# Patient Record
Sex: MALE | Race: WHITE | Employment: UNEMPLOYED | URBAN - METROPOLITAN AREA
[De-identification: names, ages, dates, MRNs, and addresses within clinical notes are randomized per-mention and may not be internally consistent; named-entity substitution may affect disease eponyms.]

---

## 2021-12-14 ENCOUNTER — APPOINTMENT (OUTPATIENT)
Dept: GENERAL RADIOLOGY | Age: 31
DRG: 316 | End: 2021-12-14
Attending: EMERGENCY MEDICINE
Payer: MEDICAID

## 2021-12-14 ENCOUNTER — APPOINTMENT (OUTPATIENT)
Dept: CT IMAGING | Age: 31
DRG: 316 | End: 2021-12-14
Attending: EMERGENCY MEDICINE
Payer: MEDICAID

## 2021-12-14 ENCOUNTER — HOSPITAL ENCOUNTER (INPATIENT)
Age: 31
LOS: 8 days | Discharge: HOME | DRG: 316 | End: 2021-12-22
Attending: EMERGENCY MEDICINE | Admitting: INTERNAL MEDICINE
Payer: MEDICAID

## 2021-12-14 DIAGNOSIS — R10.9 ABDOMINAL PAIN, UNSPECIFIED ABDOMINAL LOCATION: ICD-10-CM

## 2021-12-14 DIAGNOSIS — B18.2 CHRONIC HEPATITIS C WITHOUT HEPATIC COMA (HCC): ICD-10-CM

## 2021-12-14 DIAGNOSIS — M86.9 OSTEOMYELITIS OF FINGER OF RIGHT HAND (HCC): Primary | ICD-10-CM

## 2021-12-14 DIAGNOSIS — F19.10 POLYSUBSTANCE ABUSE (HCC): ICD-10-CM

## 2021-12-14 DIAGNOSIS — E43 SEVERE PROTEIN-CALORIE MALNUTRITION (HCC): ICD-10-CM

## 2021-12-14 DIAGNOSIS — F11.93 OPIATE WITHDRAWAL (HCC): ICD-10-CM

## 2021-12-14 LAB
ALBUMIN SERPL-MCNC: 3.4 G/DL (ref 3.5–5)
ALBUMIN/GLOB SERPL: 0.8 {RATIO} (ref 1.1–2.2)
ALP SERPL-CCNC: 70 U/L (ref 45–117)
ALT SERPL-CCNC: 21 U/L (ref 12–78)
ANION GAP SERPL CALC-SCNC: 6 MMOL/L (ref 5–15)
AST SERPL-CCNC: 29 U/L (ref 15–37)
BASOPHILS # BLD: 0 K/UL (ref 0–0.1)
BASOPHILS NFR BLD: 0 % (ref 0–1)
BILIRUB SERPL-MCNC: 0.4 MG/DL (ref 0.2–1)
BUN SERPL-MCNC: 13 MG/DL (ref 6–20)
BUN/CREAT SERPL: 15 (ref 12–20)
CALCIUM SERPL-MCNC: 10 MG/DL (ref 8.5–10.1)
CHLORIDE SERPL-SCNC: 105 MMOL/L (ref 97–108)
CO2 SERPL-SCNC: 27 MMOL/L (ref 21–32)
CREAT SERPL-MCNC: 0.86 MG/DL (ref 0.7–1.3)
DIFFERENTIAL METHOD BLD: ABNORMAL
EOSINOPHIL # BLD: 0 K/UL (ref 0–0.4)
EOSINOPHIL NFR BLD: 0 % (ref 0–7)
ERYTHROCYTE [DISTWIDTH] IN BLOOD BY AUTOMATED COUNT: 14 % (ref 11.5–14.5)
GLOBULIN SER CALC-MCNC: 4.4 G/DL (ref 2–4)
GLUCOSE SERPL-MCNC: 118 MG/DL (ref 65–100)
HCT VFR BLD AUTO: 41.7 % (ref 36.6–50.3)
HGB BLD-MCNC: 13.9 G/DL (ref 12.1–17)
IMM GRANULOCYTES # BLD AUTO: 0.1 K/UL (ref 0–0.04)
IMM GRANULOCYTES NFR BLD AUTO: 1 % (ref 0–0.5)
LACTATE BLD-SCNC: 1.52 MMOL/L (ref 0.4–2)
LIPASE SERPL-CCNC: 103 U/L (ref 73–393)
LYMPHOCYTES # BLD: 2.8 K/UL (ref 0.8–3.5)
LYMPHOCYTES NFR BLD: 19 % (ref 12–49)
MAGNESIUM SERPL-MCNC: 2.2 MG/DL (ref 1.6–2.4)
MCH RBC QN AUTO: 28.1 PG (ref 26–34)
MCHC RBC AUTO-ENTMCNC: 33.3 G/DL (ref 30–36.5)
MCV RBC AUTO: 84.4 FL (ref 80–99)
MONOCYTES # BLD: 0.4 K/UL (ref 0–1)
MONOCYTES NFR BLD: 3 % (ref 5–13)
NEUTS SEG # BLD: 11.3 K/UL (ref 1.8–8)
NEUTS SEG NFR BLD: 77 % (ref 32–75)
NRBC # BLD: 0 K/UL (ref 0–0.01)
NRBC BLD-RTO: 0 PER 100 WBC
PLATELET # BLD AUTO: 634 K/UL (ref 150–400)
PMV BLD AUTO: 8.3 FL (ref 8.9–12.9)
POTASSIUM SERPL-SCNC: 3.6 MMOL/L (ref 3.5–5.1)
PROT SERPL-MCNC: 7.8 G/DL (ref 6.4–8.2)
RBC # BLD AUTO: 4.94 M/UL (ref 4.1–5.7)
RBC MORPH BLD: ABNORMAL
SODIUM SERPL-SCNC: 138 MMOL/L (ref 136–145)
TROPONIN-HIGH SENSITIVITY: 4 NG/L (ref 0–76)
WBC # BLD AUTO: 14.6 K/UL (ref 4.1–11.1)

## 2021-12-14 PROCEDURE — 96375 TX/PRO/DX INJ NEW DRUG ADDON: CPT

## 2021-12-14 PROCEDURE — 84484 ASSAY OF TROPONIN QUANT: CPT

## 2021-12-14 PROCEDURE — 74011000636 HC RX REV CODE- 636: Performed by: INTERNAL MEDICINE

## 2021-12-14 PROCEDURE — 74011250637 HC RX REV CODE- 250/637: Performed by: EMERGENCY MEDICINE

## 2021-12-14 PROCEDURE — 96374 THER/PROPH/DIAG INJ IV PUSH: CPT

## 2021-12-14 PROCEDURE — 85025 COMPLETE CBC W/AUTO DIFF WBC: CPT

## 2021-12-14 PROCEDURE — 87040 BLOOD CULTURE FOR BACTERIA: CPT

## 2021-12-14 PROCEDURE — 74011000258 HC RX REV CODE- 258: Performed by: EMERGENCY MEDICINE

## 2021-12-14 PROCEDURE — 99284 EMERGENCY DEPT VISIT MOD MDM: CPT

## 2021-12-14 PROCEDURE — 83690 ASSAY OF LIPASE: CPT

## 2021-12-14 PROCEDURE — 74011250636 HC RX REV CODE- 250/636: Performed by: INTERNAL MEDICINE

## 2021-12-14 PROCEDURE — 73140 X-RAY EXAM OF FINGER(S): CPT

## 2021-12-14 PROCEDURE — 65660000000 HC RM CCU STEPDOWN

## 2021-12-14 PROCEDURE — 83605 ASSAY OF LACTIC ACID: CPT

## 2021-12-14 PROCEDURE — 80053 COMPREHEN METABOLIC PANEL: CPT

## 2021-12-14 PROCEDURE — 74011250636 HC RX REV CODE- 250/636: Performed by: EMERGENCY MEDICINE

## 2021-12-14 PROCEDURE — 83735 ASSAY OF MAGNESIUM: CPT

## 2021-12-14 PROCEDURE — 36415 COLL VENOUS BLD VENIPUNCTURE: CPT

## 2021-12-14 PROCEDURE — 74177 CT ABD & PELVIS W/CONTRAST: CPT

## 2021-12-14 PROCEDURE — 71045 X-RAY EXAM CHEST 1 VIEW: CPT

## 2021-12-14 RX ORDER — ONDANSETRON 2 MG/ML
4 INJECTION INTRAMUSCULAR; INTRAVENOUS
Status: DISCONTINUED | OUTPATIENT
Start: 2021-12-14 | End: 2021-12-22 | Stop reason: HOSPADM

## 2021-12-14 RX ORDER — SODIUM CHLORIDE 9 MG/ML
75 INJECTION, SOLUTION INTRAVENOUS CONTINUOUS
Status: DISCONTINUED | OUTPATIENT
Start: 2021-12-14 | End: 2021-12-16

## 2021-12-14 RX ORDER — ONDANSETRON 2 MG/ML
4 INJECTION INTRAMUSCULAR; INTRAVENOUS
Status: COMPLETED | OUTPATIENT
Start: 2021-12-14 | End: 2021-12-14

## 2021-12-14 RX ORDER — ACETAMINOPHEN 325 MG/1
650 TABLET ORAL
Status: DISCONTINUED | OUTPATIENT
Start: 2021-12-14 | End: 2021-12-22 | Stop reason: HOSPADM

## 2021-12-14 RX ORDER — SODIUM CHLORIDE 0.9 % (FLUSH) 0.9 %
5-40 SYRINGE (ML) INJECTION EVERY 8 HOURS
Status: DISCONTINUED | OUTPATIENT
Start: 2021-12-14 | End: 2021-12-22 | Stop reason: HOSPADM

## 2021-12-14 RX ORDER — FOLIC ACID 1 MG/1
1 TABLET ORAL DAILY
Status: DISCONTINUED | OUTPATIENT
Start: 2021-12-15 | End: 2021-12-22 | Stop reason: HOSPADM

## 2021-12-14 RX ORDER — FAMOTIDINE 20 MG/1
20 TABLET, FILM COATED ORAL
Status: COMPLETED | OUTPATIENT
Start: 2021-12-14 | End: 2021-12-14

## 2021-12-14 RX ORDER — POLYETHYLENE GLYCOL 3350 17 G/17G
17 POWDER, FOR SOLUTION ORAL DAILY PRN
Status: DISCONTINUED | OUTPATIENT
Start: 2021-12-14 | End: 2021-12-22 | Stop reason: HOSPADM

## 2021-12-14 RX ORDER — VANCOMYCIN/0.9 % SOD CHLORIDE 1.5G/250ML
1500 PLASTIC BAG, INJECTION (ML) INTRAVENOUS
Status: COMPLETED | OUTPATIENT
Start: 2021-12-14 | End: 2021-12-14

## 2021-12-14 RX ORDER — SODIUM CHLORIDE 0.9 % (FLUSH) 0.9 %
5-40 SYRINGE (ML) INJECTION AS NEEDED
Status: DISCONTINUED | OUTPATIENT
Start: 2021-12-14 | End: 2021-12-22 | Stop reason: HOSPADM

## 2021-12-14 RX ORDER — HEPARIN SODIUM 5000 [USP'U]/ML
5000 INJECTION, SOLUTION INTRAVENOUS; SUBCUTANEOUS EVERY 12 HOURS
Status: DISCONTINUED | OUTPATIENT
Start: 2021-12-16 | End: 2021-12-20 | Stop reason: SDUPTHER

## 2021-12-14 RX ORDER — LORAZEPAM 2 MG/ML
1 INJECTION INTRAMUSCULAR
Status: COMPLETED | OUTPATIENT
Start: 2021-12-14 | End: 2021-12-14

## 2021-12-14 RX ORDER — ENOXAPARIN SODIUM 100 MG/ML
40 INJECTION SUBCUTANEOUS DAILY
Status: DISCONTINUED | OUTPATIENT
Start: 2021-12-16 | End: 2021-12-14

## 2021-12-14 RX ORDER — ACETAMINOPHEN 650 MG/1
650 SUPPOSITORY RECTAL
Status: DISCONTINUED | OUTPATIENT
Start: 2021-12-14 | End: 2021-12-22 | Stop reason: HOSPADM

## 2021-12-14 RX ORDER — ONDANSETRON 4 MG/1
4 TABLET, ORALLY DISINTEGRATING ORAL
Status: DISCONTINUED | OUTPATIENT
Start: 2021-12-14 | End: 2021-12-22 | Stop reason: HOSPADM

## 2021-12-14 RX ORDER — ASPIRIN 325 MG/1
100 TABLET, FILM COATED ORAL DAILY
Status: DISCONTINUED | OUTPATIENT
Start: 2021-12-15 | End: 2021-12-22 | Stop reason: HOSPADM

## 2021-12-14 RX ORDER — LORAZEPAM 2 MG/ML
2 INJECTION INTRAMUSCULAR
Status: DISCONTINUED | OUTPATIENT
Start: 2021-12-14 | End: 2021-12-18

## 2021-12-14 RX ORDER — LORAZEPAM 2 MG/ML
4 INJECTION INTRAMUSCULAR
Status: DISCONTINUED | OUTPATIENT
Start: 2021-12-14 | End: 2021-12-18

## 2021-12-14 RX ADMIN — SODIUM CHLORIDE 1000 ML: 9 INJECTION, SOLUTION INTRAVENOUS at 15:31

## 2021-12-14 RX ADMIN — LORAZEPAM 1 MG: 2 INJECTION INTRAMUSCULAR; INTRAVENOUS at 15:28

## 2021-12-14 RX ADMIN — FAMOTIDINE 20 MG: 20 TABLET ORAL at 15:29

## 2021-12-14 RX ADMIN — Medication 10 ML: at 22:17

## 2021-12-14 RX ADMIN — CEFEPIME HYDROCHLORIDE 2 G: 2 INJECTION, POWDER, FOR SOLUTION INTRAVENOUS at 17:40

## 2021-12-14 RX ADMIN — VANCOMYCIN HYDROCHLORIDE 1500 MG: 10 INJECTION, POWDER, LYOPHILIZED, FOR SOLUTION INTRAVENOUS at 17:40

## 2021-12-14 RX ADMIN — LORAZEPAM 2 MG: 2 INJECTION INTRAMUSCULAR; INTRAVENOUS at 19:06

## 2021-12-14 RX ADMIN — ONDANSETRON 4 MG: 2 INJECTION INTRAMUSCULAR; INTRAVENOUS at 15:28

## 2021-12-14 RX ADMIN — IOPAMIDOL 100 ML: 755 INJECTION, SOLUTION INTRAVENOUS at 18:38

## 2021-12-14 NOTE — ED NOTES
Pt presents to ED today from [facility] with c/o an infected right pinky finger that he injured 3 weeks ago    Pt also reports that he's going through withdraw    Pt is alert/oriented x 4 placed on monitor x 2    Officer is at bedside

## 2021-12-14 NOTE — PROGRESS NOTES
Patient weighs < 60kg. Per protocol, will d/c enoxaparin and order heparin 5000units subcutaneous q12h.   Adjusted per original order to start 12/16 AM.

## 2021-12-14 NOTE — PROGRESS NOTES
Pharmacy Antimicrobial Kinetic Dosing    Indication for Antimicrobials: OM     Current Regimen of Each Antimicrobial:  Vancomycin, pharmacy to dose (Start Date ; Day # 1)  Cefepime 2g IV q8h (start ; day 1)    Previous Antimicrobial Therapy:    Goal Level: AUC: 400-600 mg/hr/Liter/day    Date Dose & Interval Measured (mcg/mL) Predicted AUC/RADHA                       Date & time of next level:     Dosing calculator used: InformaatRWapi calculator    Significant Positive Cultures:    pbcx -     Conditions for Dosing Consideration: None     Labs:  Recent Labs     21  1532   CREA 0.86   BUN 13     Recent Labs     21  1532   WBC 14.6*     Temp (24hrs), Av.7 °F (36.5 °C), Min:97.6 °F (36.4 °C), Max:97.9 °F (36.6 °C)        Creatinine Clearance (mL/min):   CrCl (Ideal Body Weight): 130.9   If actual weight < IBW: CrCl (Actual Body Weight) 97.6    Impression/Plan:   Continue cefepime as ordered  Patient received vancomycin 1500mg IV x 1. Will follow with vancomycin 1g IV q12h to give an AUC of 517  BMP ordered for AM  Antimicrobial stop date TBD     Pharmacy will follow daily and adjust medications as appropriate for renal function and/or serum levels.     Thank you,  Zena Nieves, PHARMD    Vancomycin Dosing Document    Documents located on pharmacy Teams site: Clinical Practice -> Antimicrobial Stewardship -> Antibiotics_Vancomycin     Aminoglycoside Dosing Document    Documents located on pharmacy Teams site: Clinical Practice -> Antimicrobial Stewardship -> Antibiotics_Aminoglycosides

## 2021-12-14 NOTE — ED PROVIDER NOTES
EMERGENCY DEPARTMENT HISTORY AND PHYSICAL EXAM      Date: 12/14/2021  Patient Name: Shira Knight    History of Presenting Illness     Chief Complaint   Patient presents with    Finger Pain       History Provided By: Patient and Law Enforcement    HPI: Shira Knight, 36 y.o. male presents to the ED with cc of right finger pain and withdrawal.  The patient was booked into the [facility] care home yesterday. States that he slammed his right 5th finger with a hammer 3 weeks ago. Indicates he had a blood blister which he burst, and continues to have pain. He is right-hand dominant. Pain is 9 out of 10 in severity, is worse with palpation. He denies fever or chills. He believes he is withdrawing from fentanyl and heroin. He has vomited multiple times today, and has abdominal pain with each episode. Pain is of moderate severity. It is located in the mid abdomen. He denies lightheadedness or dizziness. He denies any change in bowel habits or dysuria. Denies cough, fever or chills. He has had intermittent chest pain since this morning. It is located in the mid chest and does not radiate to the neck, back, jaw or arms. He denies shortness of breath or diaphoresis. Denies leg pain or leg edema. There are no other complaints, changes, or physical findings at this time. PCP: None    No current facility-administered medications on file prior to encounter. No current outpatient medications on file prior to encounter. Past History     Past Medical History:  Past Medical History:   Diagnosis Date    No pertinent past medical history        Past Surgical History:  No past surgical history on file. Family History:  No family history on file. Social History:  Social History     Tobacco Use    Smoking status: Not on file    Smokeless tobacco: Not on file   Substance Use Topics    Alcohol use: Not on file    Drug use: Yes     Types: Heroin, Methamphetamines, Opiates       Allergies:   Allergies Allergen Reactions    Codeine Itching         Review of Systems   Review of Systems   Constitutional: Negative for fever. HENT: Negative for congestion. Eyes: Negative. Respiratory: Negative for shortness of breath. Cardiovascular: Positive for chest pain. Gastrointestinal: Positive for abdominal pain, nausea and vomiting. Endocrine: Negative for heat intolerance. Genitourinary: Negative. Musculoskeletal: Negative for back pain. Skin: Positive for wound. Negative for rash. Allergic/Immunologic: Negative for immunocompromised state. Neurological: Negative for dizziness. Hematological: Does not bruise/bleed easily. Psychiatric/Behavioral: Negative. All other systems reviewed and are negative. Physical Exam   Physical Exam  Vitals and nursing note reviewed. Constitutional:       General: He is not in acute distress. Appearance: He is well-developed. HENT:      Head: Normocephalic and atraumatic. Cardiovascular:      Rate and Rhythm: Regular rhythm. Tachycardia present. Pulses: Normal pulses. Heart sounds: Normal heart sounds. Pulmonary:      Effort: Pulmonary effort is normal.      Breath sounds: Normal breath sounds. Abdominal:      General: Bowel sounds are normal.      Palpations: Abdomen is soft. Tenderness: There is abdominal tenderness. Musculoskeletal:         General: Tenderness present. Normal range of motion. Cervical back: Normal range of motion. Comments: tender right mid to proximal 5th finger, erythema, discoloration of the nailbed   Skin:     General: Skin is warm and dry. Neurological:      General: No focal deficit present. Mental Status: He is alert and oriented to person, place, and time.       Coordination: Coordination normal.   Psychiatric:         Mood and Affect: Mood normal.         Behavior: Behavior normal.         Diagnostic Study Results     Labs -     Recent Results (from the past 12 hour(s))   CBC WITH AUTOMATED DIFF    Collection Time: 12/14/21  3:32 PM   Result Value Ref Range    WBC 14.6 (H) 4.1 - 11.1 K/uL    RBC 4.94 4.10 - 5.70 M/uL    HGB 13.9 12.1 - 17.0 g/dL    HCT 41.7 36.6 - 50.3 %    MCV 84.4 80.0 - 99.0 FL    MCH 28.1 26.0 - 34.0 PG    MCHC 33.3 30.0 - 36.5 g/dL    RDW 14.0 11.5 - 14.5 %    PLATELET 572 (H) 345 - 400 K/uL    MPV 8.3 (L) 8.9 - 12.9 FL    NRBC 0.0 0  WBC    ABSOLUTE NRBC 0.00 0.00 - 0.01 K/uL    NEUTROPHILS 77 (H) 32 - 75 %    LYMPHOCYTES 19 12 - 49 %    MONOCYTES 3 (L) 5 - 13 %    EOSINOPHILS 0 0 - 7 %    BASOPHILS 0 0 - 1 %    IMMATURE GRANULOCYTES 1 (H) 0.0 - 0.5 %    ABS. NEUTROPHILS 11.3 (H) 1.8 - 8.0 K/UL    ABS. LYMPHOCYTES 2.8 0.8 - 3.5 K/UL    ABS. MONOCYTES 0.4 0.0 - 1.0 K/UL    ABS. EOSINOPHILS 0.0 0.0 - 0.4 K/UL    ABS. BASOPHILS 0.0 0.0 - 0.1 K/UL    ABS. IMM. GRANS. 0.1 (H) 0.00 - 0.04 K/UL    DF SMEAR SCANNED      RBC COMMENTS NORMOCYTIC, NORMOCHROMIC     METABOLIC PANEL, COMPREHENSIVE    Collection Time: 12/14/21  3:32 PM   Result Value Ref Range    Sodium 138 136 - 145 mmol/L    Potassium 3.6 3.5 - 5.1 mmol/L    Chloride 105 97 - 108 mmol/L    CO2 27 21 - 32 mmol/L    Anion gap 6 5 - 15 mmol/L    Glucose 118 (H) 65 - 100 mg/dL    BUN 13 6 - 20 MG/DL    Creatinine 0.86 0.70 - 1.30 MG/DL    BUN/Creatinine ratio 15 12 - 20      GFR est AA >60 >60 ml/min/1.73m2    GFR est non-AA >60 >60 ml/min/1.73m2    Calcium 10.0 8.5 - 10.1 MG/DL    Bilirubin, total 0.4 0.2 - 1.0 MG/DL    ALT (SGPT) 21 12 - 78 U/L    AST (SGOT) 29 15 - 37 U/L    Alk.  phosphatase 70 45 - 117 U/L    Protein, total 7.8 6.4 - 8.2 g/dL    Albumin 3.4 (L) 3.5 - 5.0 g/dL    Globulin 4.4 (H) 2.0 - 4.0 g/dL    A-G Ratio 0.8 (L) 1.1 - 2.2     LIPASE    Collection Time: 12/14/21  3:32 PM   Result Value Ref Range    Lipase 103 73 - 393 U/L   MAGNESIUM    Collection Time: 12/14/21  3:32 PM   Result Value Ref Range    Magnesium 2.2 1.6 - 2.4 mg/dL   TROPONIN-HIGH SENSITIVITY    Collection Time: 12/14/21  3:32 PM   Result Value Ref Range    Troponin-High Sensitivity 4 0 - 76 ng/L   POC LACTIC ACID    Collection Time: 12/14/21  3:41 PM   Result Value Ref Range    Lactic Acid (POC) 1.52 0.40 - 2.00 mmol/L       Radiologic Studies -   CT ABD PELV W CONT   Final Result   1. No definite acute abnormality. 2.  Heterogeneous liver attenuation, nonspecific, but can be seen with   hepatitis. Additionally, several wedge-shaped peripheral regions of enhancement   in the liver are noted, which favor areas of shunting versus hemangiomas. Nonemergent liver mass protocol CT or MRI can be obtained as an outpatient for   definitive characterization      XR CHEST PORT   Final Result      No acute findings. XR 5TH FINGER RT MIN 2 V   Final Result   Findings concerning for acute osteomyelitis involving the tuft of   the fifth distal phalanx. MRI UP EXT JT RT W CONT    (Results Pending)     CT Results  (Last 48 hours)    None        CXR Results  (Last 48 hours)    None          Medical Decision Making   I am the first provider for this patient. I reviewed the vital signs, available nursing notes, past medical history, past surgical history, family history and social history. Vital Signs-Reviewed the patient's vital signs. Patient Vitals for the past 12 hrs:   Temp Pulse Resp BP SpO2   12/14/21 1410 97.7 °F (36.5 °C) 65 18 137/82 100 %           Records Reviewed: Nursing Notes and Old Medical Records    Provider Notes (Medical Decision Making):   Opiate withdrawal, dehydration, electrolyte abnormality, cellulitis, nailbed injury, osteomyelitis    ED Course:   Initial assessment performed. The patients presenting problems have been discussed, and they are in agreement with the care plan formulated and outlined with them. I have encouraged them to ask questions as they arise throughout their visit.       Consult note:    Patient is being admitted by Dr. Antoni Roger, hospitalist      Consult note:    Case has been discussed with Lorie Cerna, orthopedic surgery. Informed he and Dr. Girma Martinez, regarding the patient's x-ray results                Critical Care Time:   CRITICAL CARE NOTE :    3:52 PM    IMPENDING DETERIORATION -Metabolic  ASSOCIATED RISK FACTORS - Hypotension, CNS decompensation,  MANAGEMENT- Bedside Assessment and Supervision of Care  INTERPRETATION -  Xrays and Blood Pressure  INTERVENTIONS - hemodynamic mngmt and Metobolic interventions  CASE REVIEW - Hospitalist/Intensivist, Medical Sub-Specialist and Nursing  TREATMENT RESPONSE -Improved  PERFORMED BY - Self    NOTES   :  I have spent 40 minutes of critical care time involved in lab review, consultations with specialist, family decision- making, bedside attention and documentation. This time excludes time spent in any separate billed procedures. During this entire length of time I was immediately available to the patient . Kiran Brandt MD      Disposition:  admit    DISCHARGE PLAN:  1. There are no discharge medications for this patient. 2.   Follow-up Information    None       3. Return to ED if worse     Diagnosis     Clinical Impression:   1. Osteomyelitis of finger of right hand ([address]    2. Opiate withdrawal (HCC)        Attestations:    Kiran Brandt MD    Please note that this dictation was completed with [employer], the computer voice recognition software. Quite often unanticipated grammatical, syntax, homophones, and other interpretive errors are inadvertently transcribed by the computer software. Please disregard these errors. Please excuse any errors that have escaped final proofreading. Thank you.

## 2021-12-15 ENCOUNTER — APPOINTMENT (OUTPATIENT)
Dept: CT IMAGING | Age: 31
DRG: 316 | End: 2021-12-15
Attending: GENERAL ACUTE CARE HOSPITAL
Payer: MEDICAID

## 2021-12-15 ENCOUNTER — APPOINTMENT (OUTPATIENT)
Dept: MRI IMAGING | Age: 31
DRG: 316 | End: 2021-12-15
Attending: EMERGENCY MEDICINE
Payer: MEDICAID

## 2021-12-15 LAB
ANION GAP SERPL CALC-SCNC: 8 MMOL/L (ref 5–15)
BUN SERPL-MCNC: 12 MG/DL (ref 6–20)
BUN/CREAT SERPL: 15 (ref 12–20)
CALCIUM SERPL-MCNC: 9.7 MG/DL (ref 8.5–10.1)
CHLORIDE SERPL-SCNC: 104 MMOL/L (ref 97–108)
CO2 SERPL-SCNC: 24 MMOL/L (ref 21–32)
CREAT SERPL-MCNC: 0.8 MG/DL (ref 0.7–1.3)
CRP SERPL-MCNC: <0.29 MG/DL (ref 0–0.6)
ERYTHROCYTE [DISTWIDTH] IN BLOOD BY AUTOMATED COUNT: 14.3 % (ref 11.5–14.5)
ERYTHROCYTE [SEDIMENTATION RATE] IN BLOOD: 22 MM/HR (ref 0–15)
GLUCOSE SERPL-MCNC: 113 MG/DL (ref 65–100)
HAV IGM SER QL: NONREACTIVE
HBV CORE IGM SER QL: NONREACTIVE
HBV SURFACE AG SER QL: 0.26 INDEX
HBV SURFACE AG SER QL: NEGATIVE
HCT VFR BLD AUTO: 39.4 % (ref 36.6–50.3)
HCV AB SER IA-ACNC: >11 INDEX
HCV AB SERPL QL IA: REACTIVE
HGB BLD-MCNC: 13.2 G/DL (ref 12.1–17)
HIV 1+2 AB+HIV1 P24 AG SERPL QL IA: NONREACTIVE
HIV12 RESULT COMMENT, HHIVC: NORMAL
MCH RBC QN AUTO: 28.1 PG (ref 26–34)
MCHC RBC AUTO-ENTMCNC: 33.5 G/DL (ref 30–36.5)
MCV RBC AUTO: 83.8 FL (ref 80–99)
NRBC # BLD: 0 K/UL (ref 0–0.01)
NRBC BLD-RTO: 0 PER 100 WBC
PLATELET # BLD AUTO: 569 K/UL (ref 150–400)
PMV BLD AUTO: 8.5 FL (ref 8.9–12.9)
POTASSIUM SERPL-SCNC: 3.7 MMOL/L (ref 3.5–5.1)
RBC # BLD AUTO: 4.7 M/UL (ref 4.1–5.7)
SODIUM SERPL-SCNC: 136 MMOL/L (ref 136–145)
SP1: ABNORMAL
SP2: ABNORMAL
SP3: ABNORMAL
WBC # BLD AUTO: 17.8 K/UL (ref 4.1–11.1)

## 2021-12-15 PROCEDURE — 74011250637 HC RX REV CODE- 250/637: Performed by: INTERNAL MEDICINE

## 2021-12-15 PROCEDURE — 74011000258 HC RX REV CODE- 258: Performed by: INTERNAL MEDICINE

## 2021-12-15 PROCEDURE — 85652 RBC SED RATE AUTOMATED: CPT

## 2021-12-15 PROCEDURE — 80048 BASIC METABOLIC PNL TOTAL CA: CPT

## 2021-12-15 PROCEDURE — 85027 COMPLETE CBC AUTOMATED: CPT

## 2021-12-15 PROCEDURE — 74178 CT ABD&PLV WO CNTR FLWD CNTR: CPT

## 2021-12-15 PROCEDURE — 86140 C-REACTIVE PROTEIN: CPT

## 2021-12-15 PROCEDURE — 2709999900 HC NON-CHARGEABLE SUPPLY

## 2021-12-15 PROCEDURE — 65660000000 HC RM CCU STEPDOWN

## 2021-12-15 PROCEDURE — 87389 HIV-1 AG W/HIV-1&-2 AB AG IA: CPT

## 2021-12-15 PROCEDURE — 74011000636 HC RX REV CODE- 636: Performed by: GENERAL ACUTE CARE HOSPITAL

## 2021-12-15 PROCEDURE — 74011250637 HC RX REV CODE- 250/637: Performed by: GENERAL ACUTE CARE HOSPITAL

## 2021-12-15 PROCEDURE — 74011250636 HC RX REV CODE- 250/636: Performed by: INTERNAL MEDICINE

## 2021-12-15 PROCEDURE — 74011250636 HC RX REV CODE- 250/636: Performed by: GENERAL ACUTE CARE HOSPITAL

## 2021-12-15 PROCEDURE — 80074 ACUTE HEPATITIS PANEL: CPT

## 2021-12-15 PROCEDURE — 36415 COLL VENOUS BLD VENIPUNCTURE: CPT

## 2021-12-15 PROCEDURE — 94760 N-INVAS EAR/PLS OXIMETRY 1: CPT

## 2021-12-15 RX ORDER — MORPHINE SULFATE 2 MG/ML
1 INJECTION, SOLUTION INTRAMUSCULAR; INTRAVENOUS
Status: DISCONTINUED | OUTPATIENT
Start: 2021-12-15 | End: 2021-12-21

## 2021-12-15 RX ORDER — FAMOTIDINE 20 MG/1
20 TABLET, FILM COATED ORAL 2 TIMES DAILY
Status: DISCONTINUED | OUTPATIENT
Start: 2021-12-15 | End: 2021-12-22 | Stop reason: HOSPADM

## 2021-12-15 RX ORDER — AMOXICILLIN 250 MG
2 CAPSULE ORAL DAILY
Status: DISCONTINUED | OUTPATIENT
Start: 2021-12-16 | End: 2021-12-22 | Stop reason: HOSPADM

## 2021-12-15 RX ORDER — OXYCODONE HYDROCHLORIDE 5 MG/1
5 TABLET ORAL
Status: DISCONTINUED | OUTPATIENT
Start: 2021-12-15 | End: 2021-12-22 | Stop reason: HOSPADM

## 2021-12-15 RX ORDER — PROMETHAZINE HYDROCHLORIDE 25 MG/1
25 TABLET ORAL
Status: DISCONTINUED | OUTPATIENT
Start: 2021-12-15 | End: 2021-12-22 | Stop reason: HOSPADM

## 2021-12-15 RX ADMIN — ACETAMINOPHEN 650 MG: 325 TABLET ORAL at 08:06

## 2021-12-15 RX ADMIN — VANCOMYCIN HYDROCHLORIDE 1000 MG: 1 INJECTION, POWDER, LYOPHILIZED, FOR SOLUTION INTRAVENOUS at 06:05

## 2021-12-15 RX ADMIN — MORPHINE SULFATE 1 MG: 2 INJECTION, SOLUTION INTRAMUSCULAR; INTRAVENOUS at 19:19

## 2021-12-15 RX ADMIN — Medication 10 ML: at 00:13

## 2021-12-15 RX ADMIN — MORPHINE SULFATE 1 MG: 2 INJECTION, SOLUTION INTRAMUSCULAR; INTRAVENOUS at 12:44

## 2021-12-15 RX ADMIN — IOPAMIDOL 100 ML: 755 INJECTION, SOLUTION INTRAVENOUS at 16:17

## 2021-12-15 RX ADMIN — ONDANSETRON 4 MG: 2 INJECTION INTRAMUSCULAR; INTRAVENOUS at 16:44

## 2021-12-15 RX ADMIN — Medication 100 MG: at 08:06

## 2021-12-15 RX ADMIN — FAMOTIDINE 20 MG: 20 TABLET ORAL at 11:30

## 2021-12-15 RX ADMIN — LORAZEPAM 2 MG: 2 INJECTION INTRAMUSCULAR; INTRAVENOUS at 02:30

## 2021-12-15 RX ADMIN — VANCOMYCIN HYDROCHLORIDE 1000 MG: 1 INJECTION, POWDER, LYOPHILIZED, FOR SOLUTION INTRAVENOUS at 19:20

## 2021-12-15 RX ADMIN — ONDANSETRON 4 MG: 2 INJECTION INTRAMUSCULAR; INTRAVENOUS at 00:13

## 2021-12-15 RX ADMIN — FOLIC ACID 1 MG: 1 TABLET ORAL at 08:06

## 2021-12-15 RX ADMIN — Medication 10 ML: at 16:44

## 2021-12-15 RX ADMIN — ONDANSETRON 4 MG: 2 INJECTION INTRAMUSCULAR; INTRAVENOUS at 21:38

## 2021-12-15 RX ADMIN — OXYCODONE 5 MG: 5 TABLET ORAL at 11:30

## 2021-12-15 RX ADMIN — OXYCODONE 5 MG: 5 TABLET ORAL at 16:45

## 2021-12-15 RX ADMIN — Medication 10 ML: at 21:38

## 2021-12-15 RX ADMIN — ACETAMINOPHEN 650 MG: 325 TABLET ORAL at 16:45

## 2021-12-15 RX ADMIN — CEFEPIME HYDROCHLORIDE 2 G: 2 INJECTION, POWDER, FOR SOLUTION INTRAVENOUS at 10:41

## 2021-12-15 RX ADMIN — CEFEPIME HYDROCHLORIDE 2 G: 2 INJECTION, POWDER, FOR SOLUTION INTRAVENOUS at 17:52

## 2021-12-15 RX ADMIN — CEFEPIME HYDROCHLORIDE 2 G: 2 INJECTION, POWDER, FOR SOLUTION INTRAVENOUS at 02:45

## 2021-12-15 RX ADMIN — ONDANSETRON 4 MG: 2 INJECTION INTRAMUSCULAR; INTRAVENOUS at 08:06

## 2021-12-15 RX ADMIN — FAMOTIDINE 20 MG: 20 TABLET ORAL at 17:52

## 2021-12-15 NOTE — PROGRESS NOTES
Bedside shift change report given to  (oncoming nurse) by Phyllistine Dancer (offgoing nurse). Report included the following information SBAR, Kardex, ED Summary, Intake/Output, MAR and Recent Results.

## 2021-12-15 NOTE — PROGRESS NOTES
Hospitalist Progress Note    NAME: Rodrigo Wood   :  1990   MRN:  [id number]       Assessment / Plan:    Acute osteomyelitis of right fifth distal phalanx  -X-ray confirms osteomyelitis. Start empiric vancomycin, cefepime. Consult orthopedics for adjustment drainage. MRI pending  Pain control.     Abdominal pain, nausea and vomiting  -CT abdomen shows liver hemangiomas but no other acute process  -CMP is normal.  Lipase level is normal.  Troponin is normal.  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     Reported hepatitis C  CT and reported Heterogeneous liver attenuation, nonspecific, but can be seen with  hepatitis. Additionally, several wedge-shaped peripheral regions of enhancement  in the liver are noted, which favor areas of shunting versus hemangiomas. Will order CT liver protocol  Check AFP  Check acute infectious panel  Check HIV    Patient is currently incarcerated and cannot call his family. He will need to return to [facility] [address]: From [facility]  and is currently incarcerated  less than 18.5 Underweight / Body mass index is 17.22 kg/m². Code status: Full  Prophylaxis: Lovenox  Recommended Disposition: Incarcerated   Anticipated Discharge Date:  >48 hours        Subjective:     Discussed with RN events overnight. C/o finger pain  C/o epigastric pain    Review of Systems:  Symptom Y/N Comments  Symptom Y/N Comments   Fever/Chills    Chest Pain     Poor Appetite    Edema     Cough    Abdominal Pain     Sputum    Joint Pain     SOB/ANAYA    Pruritis/Rash     Nausea/vomit    Tolerating PT/OT     Diarrhea    Tolerating Diet     Constipation    Other       PO intake: No data found.     Wt Readings from Last 10 Encounters:   12/14/21 54.4 kg (120 lb)       Objective:     VITALS:   Last 24hrs VS reviewed since prior progress note. Most recent are:  Patient Vitals for the past 24 hrs:   Temp Pulse Resp BP SpO2   12/15/21 1135 98 °F (36.7 °C) 83 16 129/87 99 %   12/15/21 0745 99.3 °F (37.4 °C) (!) 58 16 (!) 147/94 96 %   12/15/21 0417 98.8 °F (37.1 °C) 67 16 (!) 142/88 96 %   12/14/21 2008 98.8 °F (37.1 °C) (!) 57 17 (!) 141/90 100 %   12/14/21 1847 98.8 °F (37.1 °C) 60 18 (!) 140/88 100 %   12/14/21 1755 97.6 °F (36.4 °C) 60 18 (!) 149/91 100 %   12/14/21 1632     100 %   12/14/21 1555 97.9 °F (36.6 °C) 60 18 (!) 147/96 99 %       Intake/Output Summary (Last 24 hours) at 12/15/2021 1446  Last data filed at 12/14/2021 2008  Gross per 24 hour   Intake 1100 ml   Output 300 ml   Net 800 ml        I had a face to face encounter, and independently examined this patient on 12/15/2021, as outlined below:    PHYSICAL EXAM:  General:    No distress     HEENT: Atraumatic, anicteric sclerae, pink conjunctivae, MMM  Neck:  Supple, symmetrical  Lungs:   CTA. No Wheezing/Rhonchi. No rales. No tenderness. No Accessory muscle use. Heart:   Regular rhythm. No murmur. No JVD   GI/:   Soft. Mild Epigastric tenderness. ND. BS normal  Extremities: No edema. No cyanosis. No clubbing. R 5th finger edema, redness, open wound at the tip  Skin:     Not pale. Not Jaundiced. No rashes   Psych:  Good insight. Not depressed. Not anxious or agitated. Neurologic: Alert and oriented X 4. EOMs intact. No facial asymmetry. No slurred speech. Symmetrical strength, Sensation grossly intact. Labs     I reviewed today's most current labs and imaging studies.   Pertinent labs include:  Recent Labs     12/15/21  0421 12/14/21  1532   WBC 17.8* 14.6*   HGB 13.2 13.9   HCT 39.4 41.7   * 634*     Recent Labs     12/15/21  0421 12/14/21  1532    138   K 3.7 3.6    105   CO2 24 27   * 118*   BUN 12 13   CREA 0.80 0.86 CA 9.7 10.0   MG  --  2.2   ALB  --  3.4*   TBILI  --  0.4   ALT  --  21     XR 5TH FINGER RT MIN 2 V    Result Date: 12/14/2021  Findings concerning for acute osteomyelitis involving the tuft of the fifth distal phalanx. CT ABD PELV W CONT    Result Date: 12/14/2021  1. No definite acute abnormality. 2.  Heterogeneous liver attenuation, nonspecific, but can be seen with hepatitis. Additionally, several wedge-shaped peripheral regions of enhancement in the liver are noted, which favor areas of shunting versus hemangiomas. Nonemergent liver mass protocol CT or MRI can be obtained as an outpatient for definitive characterization    XR CHEST PORT    Result Date: 12/14/2021  No acute findings. XR 5TH FINGER RT MIN 2 V    Result Date: 12/14/2021  Findings concerning for acute osteomyelitis involving the tuft of the fifth distal phalanx. CT ABD PELV W CONT    Result Date: 12/14/2021  1. No definite acute abnormality. 2.  Heterogeneous liver attenuation, nonspecific, but can be seen with hepatitis. Additionally, several wedge-shaped peripheral regions of enhancement in the liver are noted, which favor areas of shunting versus hemangiomas. Nonemergent liver mass protocol CT or MRI can be obtained as an outpatient for definitive characterization    XR CHEST PORT    Result Date: 12/14/2021  No acute findings.           Current Medications:     Current Facility-Administered Medications:     [START ON 12/16/2021] Vancomycin trough- please draw before starting dose, 1 Each, Other, ONCE, Alyce Graf MD    oxyCODONE IR (ROXICODONE) tablet 5 mg, 5 mg, Oral, Q6H PRN, Christina Graf MD, 5 mg at 12/15/21 1130    morphine injection 1 mg, 1 mg, IntraVENous, Q8H PRN, Christina Graf MD, 1 mg at 12/15/21 1244    promethazine (PHENERGAN) tablet 25 mg, 25 mg, Oral, Q6H PRN, Alyce Graf MD    famotidine (PEPCID) tablet 20 mg, 20 mg, Oral, BID, Alyce Graf MD, 20 mg at 12/15/21 1130    sodium chloride (NS) flush 5-40 mL, 5-40 mL, IntraVENous, Q8H, Edwar Tobin MD, 10 mL at 12/14/21 2217    sodium chloride (NS) flush 5-40 mL, 5-40 mL, IntraVENous, PRN, Dereck Lund MD, 10 mL at 12/15/21 0013    acetaminophen (TYLENOL) tablet 650 mg, 650 mg, Oral, Q6H PRN, 650 mg at 12/15/21 0806 **OR** acetaminophen (TYLENOL) suppository 650 mg, 650 mg, Rectal, Q6H PRN, Enoc Haile MD    polyethylene glycol (MIRALAX) packet 17 g, 17 g, Oral, DAILY PRN, Enoc Haile MD    ondansetron (ZOFRAN ODT) tablet 4 mg, 4 mg, Oral, Q8H PRN **OR** ondansetron (ZOFRAN) injection 4 mg, 4 mg, IntraVENous, Q6H PRN, Edwar Haile MD, 4 mg at 12/15/21 0806    0.9% sodium chloride infusion, 75 mL/hr, IntraVENous, CONTINUOUS, Enoc Tobin MD    cefepime (MAXIPIME) 2 g in 0.9% sodium chloride (MBP/ADV) 100 mL MBP, 2 g, IntraVENous, Q8H, Edwar Tobin MD, Last Rate: 200 mL/hr at 12/15/21 1041, 2 g at 12/15/21 1041    LORazepam (ATIVAN) injection 2 mg, 2 mg, IntraVENous, Q1H PRN, Dereck Lund MD, 2 mg at 12/15/21 0230    LORazepam (ATIVAN) injection 4 mg, 4 mg, IntraVENous, Q1H PRN, Enoc Haile MD    folic acid (FOLVITE) tablet 1 mg, 1 mg, Oral, DAILY, Edwar Tobin MD, 1 mg at 12/15/21 0806    thiamine mononitrate (B-1) tablet 100 mg, 100 mg, Oral, DAILY, Edwar Tobin MD, 100 mg at 12/15/21 0806    [START ON 12/16/2021] heparin (porcine) injection 5,000 Units, 5,000 Units, SubCUTAneous, Q12H, Enoc Tobin MD    vancomycin (VANCOCIN) 1,000 mg in 0.9% sodium chloride 250 mL (VIAL-MATE), 1,000 mg, IntraVENous, Q12H, Edwar Tobin MD, Last Rate: 250 mL/hr at 12/15/21 0605, 1,000 mg at 12/15/21 0605     Procedures: see electronic medical records for all procedures/Xrays and details which were not copied into this note but were reviewed prior to creation of Plan.     Reviewed most current lab test results and cultures  YES  Reviewed most current radiology test results   YES  Review and summation of old records today    NO  Reviewed patient's current orders and MAR    YES  PMH/SH reviewed - no change compared to H&P  ________________________________________________________________________  Care Plan discussed with:    Comments   Patient x    Family      RN     Care Manager     Consultant  x                      Multidiciplinary team rounds were held today with , nursing, pharmacist and clinical coordinator. Patient's plan of care was discussed; medications were reviewed and discharge planning was addressed.      ________________________________________________________________________  Total NON critical care TIME:   37  Minutes    Total CRITICAL CARE TIME Spent:   Minutes non procedure based      Comments   >50% of visit spent in counseling and coordination of care x     This includes time during multidisciplinary rounds if indicated above   ________________________________________________________________________  Giuliana Montenegro MD

## 2021-12-15 NOTE — H&P
Hospitalist Admission Note    NAME: Jeffrey Rick   :  1990   MRN:  [id number]     Date/Time:  2021 7:37 PM    Patient PCP: None  ________________________________________________________________________    My assessment of this patient's clinical condition and my plan of care is as follows. Assessment / Plan:  Acute osteomyelitis of right fifth distal phalanx  -X-ray confirms osteomyelitis. Start empiric vancomycin, cefepime. Consult orthopedics for adjustment drainage. Keep n.p.o. from midnight. Pain control. Abdominal pain, nausea and vomiting  -CT abdomen shows liver hemangiomas but no other acute process  -CMP is normal.  Lipase level is normal.  Troponin is normal.  -Check urine drug screen    Reported withdrawal from heroin  -Patient is reporting that he is withdrawing from fentanyl and heroin  -Check urine drug screen  -Start alcohol withdrawal protocol with Ativan. Patient is currently incarcerated and cannot call his family. He will need to return to [facility]  alf        Code Status: Full code  Surrogate Decision Maker:    DVT Prophylaxis: Heparin  GI Prophylaxis: not indicated    Baseline: From [facility]  and is currently incarcerated        Subjective:   CHIEF COMPLAINT: Right fifth finger pain    HISTORY OF PRESENT ILLNESS:     Jeffrey Rick is a 36 y.o.male who presents with no significant past medical history is coming the hospital chief complaints of right fifth finger pain. Patient was incarcerated yesterday and is apparently alf. He reports that he had a blood blister about 4 days ago and it burst open and subsequently started to have some drainage along with pain and also redness. He does not report any fever or chills. Does not seem reports similar episodes in the past.  Reports he may be withdrawing from fentanyl and heroin in. He also reports nausea along with some right-sided upper abdominal pain.     On arrival to ED, vital signs are normal.  On labs white blood cell count shows 14,000. On labs BMP is normal.  LFTs are normal.  Lipase is normal.  Troponin is normal.  CT abdomen shows possibility of liver hemangiomas and recommended CT per liver mass protocol. We were asked to admit for work up and evaluation of the above problems. Past Medical History:   Diagnosis Date    No pertinent past medical history         No past surgical history on file. Social History     Tobacco Use    Smoking status: Not on file    Smokeless tobacco: Not on file   Substance Use Topics    Alcohol use: Not on file        Family history  No CAD in the family    Allergies   Allergen Reactions    Codeine Itching        Prior to Admission medications    Not on File       REVIEW OF SYSTEMS:     I am not able to complete the review of systems because:    The patient is intubated and sedated    The patient has altered mental status due to his acute medical problems    The patient has baseline aphasia from prior stroke(s)    The patient has baseline dementia and is not reliable historian    The patient is in acute medical distress and unable to provide information           Total of 12 systems reviewed as follows:       POSITIVE= underlined text  Negative = text not underlined  General:  fever, chills, sweats, generalized weakness, weight loss/gain,      loss of appetite   Eyes:    blurred vision, eye pain, loss of vision, double vision  ENT:    rhinorrhea, pharyngitis   Respiratory:   cough, sputum production, SOB, ANAYA, wheezing, pleuritic pain   Cardiology:   chest pain, palpitations, orthopnea, PND, edema, syncope   Gastrointestinal:  abdominal pain , N/V, diarrhea, dysphagia, constipation, bleeding   Genitourinary:  frequency, urgency, dysuria, hematuria, incontinence   Muskuloskeletal :  arthralgia, myalgia, back pain  Hematology:  easy bruising, nose or gum bleeding, lymphadenopathy   Dermatological: rash, ulceration, pruritis, color change / jaundice  Endocrine:   hot flashes or polydipsia   Neurological:  headache, dizziness, confusion, focal weakness, paresthesia,     Speech difficulties, memory loss, gait difficulty  Psychological: Feelings of anxiety, depression, agitation    Objective:   VITALS:    Visit Vitals  BP (!) 140/88   Pulse 60   Temp 98.8 °F (37.1 °C)   Resp 18   Ht 5' 10\" (1.778 m)   Wt 54.4 kg (120 lb)   SpO2 100%   BMI 17.22 kg/m²       PHYSICAL EXAM:    General:    Alert, cooperative, no distress, appears stated age. HEENT: Atraumatic, anicteric sclerae, pink conjunctivae     No oral ulcers, mucosa moist  Neck:  Supple, symmetrical,  thyroid: non tender  Lungs:   Clear to auscultation bilaterally. No Wheezing or Rhonchi. No rales. Chest wall:  No tenderness  No Accessory muscle use. Heart:   Regular  rhythm,  No  murmur   No edema  Abdomen:   Soft, non-tender. Not distended. Bowel sounds normal  Extremities: Right fifth finger ulcer  Skin:     Not pale. Not Jaundiced  No rashes   Psych:  Not anxious or agitated. Neurologic: EOMs intact. No facial asymmetry. No aphasia or slurred speech. Symmetrical strength, Sensation grossly intact.  Alert and oriented X 4.     _______________________________________________________________________  Care Plan discussed with:    Comments   Patient y    Family      RN y    Care Manager                    Consultant:      _______________________________________________________________________  Expected  Disposition:   Home with Family y   HH/PT/OT/RN    SNF/LTC    ELEANOR    ________________________________________________________________________  TOTAL TIME:  61  Minutes    Critical Care Provided     Minutes non procedure based      Comments    y Reviewed previous records   >50% of visit spent in counseling and coordination of care y Discussion with patient and/or family and questions answered       ________________________________________________________________________  Signed: Dee Gautam MD    Procedures: see electronic medical records for all procedures/Xrays and details which were not copied into this note but were reviewed prior to creation of Plan. LAB DATA REVIEWED:    Recent Results (from the past 24 hour(s))   CBC WITH AUTOMATED DIFF    Collection Time: 12/14/21  3:32 PM   Result Value Ref Range    WBC 14.6 (H) 4.1 - 11.1 K/uL    RBC 4.94 4.10 - 5.70 M/uL    HGB 13.9 12.1 - 17.0 g/dL    HCT 41.7 36.6 - 50.3 %    MCV 84.4 80.0 - 99.0 FL    MCH 28.1 26.0 - 34.0 PG    MCHC 33.3 30.0 - 36.5 g/dL    RDW 14.0 11.5 - 14.5 %    PLATELET 050 (H) 924 - 400 K/uL    MPV 8.3 (L) 8.9 - 12.9 FL    NRBC 0.0 0  WBC    ABSOLUTE NRBC 0.00 0.00 - 0.01 K/uL    NEUTROPHILS 77 (H) 32 - 75 %    LYMPHOCYTES 19 12 - 49 %    MONOCYTES 3 (L) 5 - 13 %    EOSINOPHILS 0 0 - 7 %    BASOPHILS 0 0 - 1 %    IMMATURE GRANULOCYTES 1 (H) 0.0 - 0.5 %    ABS. NEUTROPHILS 11.3 (H) 1.8 - 8.0 K/UL    ABS. LYMPHOCYTES 2.8 0.8 - 3.5 K/UL    ABS. MONOCYTES 0.4 0.0 - 1.0 K/UL    ABS. EOSINOPHILS 0.0 0.0 - 0.4 K/UL    ABS. BASOPHILS 0.0 0.0 - 0.1 K/UL    ABS. IMM. GRANS. 0.1 (H) 0.00 - 0.04 K/UL    DF SMEAR SCANNED      RBC COMMENTS NORMOCYTIC, NORMOCHROMIC     METABOLIC PANEL, COMPREHENSIVE    Collection Time: 12/14/21  3:32 PM   Result Value Ref Range    Sodium 138 136 - 145 mmol/L    Potassium 3.6 3.5 - 5.1 mmol/L    Chloride 105 97 - 108 mmol/L    CO2 27 21 - 32 mmol/L    Anion gap 6 5 - 15 mmol/L    Glucose 118 (H) 65 - 100 mg/dL    BUN 13 6 - 20 MG/DL    Creatinine 0.86 0.70 - 1.30 MG/DL    BUN/Creatinine ratio 15 12 - 20      GFR est AA >60 >60 ml/min/1.73m2    GFR est non-AA >60 >60 ml/min/1.73m2    Calcium 10.0 8.5 - 10.1 MG/DL    Bilirubin, total 0.4 0.2 - 1.0 MG/DL    ALT (SGPT) 21 12 - 78 U/L    AST (SGOT) 29 15 - 37 U/L    Alk.  phosphatase 70 45 - 117 U/L    Protein, total 7.8 6.4 - 8.2 g/dL    Albumin 3.4 (L) 3.5 - 5.0 g/dL    Globulin 4.4 (H) 2.0 - 4.0 g/dL    A-G Ratio 0.8 (L) 1.1 - 2.2     LIPASE    Collection Time: 12/14/21  3:32 PM   Result Value Ref Range    Lipase 103 73 - 393 U/L   MAGNESIUM    Collection Time: 12/14/21  3:32 PM   Result Value Ref Range    Magnesium 2.2 1.6 - 2.4 mg/dL   TROPONIN-HIGH SENSITIVITY    Collection Time: 12/14/21  3:32 PM   Result Value Ref Range    Troponin-High Sensitivity 4 0 - 76 ng/L   POC LACTIC ACID    Collection Time: 12/14/21  3:41 PM   Result Value Ref Range    Lactic Acid (POC) 1.52 0.40 - 2.00 mmol/L

## 2021-12-15 NOTE — CONSULTS
ORTHOPAEDIC CONSULT NOTE    Subjective:     Date of Consultation:  December 14, 2021      Ana Khanna is a 36 y.o. male who is being seen for right 5th digit infection, osteomyelitis. Upon arrival to the ED, intially unable to evaluate due to severe withdrawal symptoms. Per chart review-   Pt reports he slammed his right 5th finger with a hammer 3 weeks ago. Indicates he had a blood blister which he burst, and continues to have pain. He is right-hand dominant. Polysubstance abuse-  Regular fentanyl, heroin user. Accompanied by Time Ghosh- pt incarcerated yesterday    Patient Active Problem List    Diagnosis Date Noted    Osteomyelitis of finger of right hand ([address] 12/14/2021     No family history on file. Social History     Tobacco Use    Smoking status: Not on file    Smokeless tobacco: Not on file   Substance Use Topics    Alcohol use: Not on file     Past Medical History:   Diagnosis Date    No pertinent past medical history       No past surgical history on file.    Prior to Admission medications    Not on File     Current Facility-Administered Medications   Medication Dose Route Frequency    sodium chloride (NS) flush 5-40 mL  5-40 mL IntraVENous Q8H    sodium chloride (NS) flush 5-40 mL  5-40 mL IntraVENous PRN    acetaminophen (TYLENOL) tablet 650 mg  650 mg Oral Q6H PRN    Or    acetaminophen (TYLENOL) suppository 650 mg  650 mg Rectal Q6H PRN    polyethylene glycol (MIRALAX) packet 17 g  17 g Oral DAILY PRN    ondansetron (ZOFRAN ODT) tablet 4 mg  4 mg Oral Q8H PRN    Or    ondansetron (ZOFRAN) injection 4 mg  4 mg IntraVENous Q6H PRN    0.9% sodium chloride infusion  75 mL/hr IntraVENous CONTINUOUS    [START ON 12/15/2021] cefepime (MAXIPIME) 2 g in 0.9% sodium chloride (MBP/ADV) 100 mL MBP  2 g IntraVENous Q8H    LORazepam (ATIVAN) injection 2 mg  2 mg IntraVENous Q1H PRN    LORazepam (ATIVAN) injection 4 mg  4 mg IntraVENous Q1H PRN    [START ON 93/33/8616] folic acid (FOLVITE) tablet 1 mg  1 mg Oral DAILY    [START ON 12/15/2021] thiamine mononitrate (B-1) tablet 100 mg  100 mg Oral DAILY    [START ON 2021] heparin (porcine) injection 5,000 Units  5,000 Units SubCUTAneous Q12H    [START ON 12/15/2021] vancomycin (VANCOCIN) 1,000 mg in 0.9% sodium chloride 250 mL (VIAL-MATE)  1,000 mg IntraVENous Q12H      Allergies   Allergen Reactions    Codeine Itching        Review of Systems:  A comprehensive review of systems was negative except for that written in the HPI. Mental Status: no dementia    Objective:     Patient Vitals for the past 8 hrs:   BP Temp Pulse Resp SpO2 Height Weight   21 (!) 141/90 98.8 °F (37.1 °C) (!) 57 17 100 %     21 1847 (!) 140/88 98.8 °F (37.1 °C) 60 18 100 %     21 1755 (!) 149/91 97.6 °F (36.4 °C) 60 18 100 %     21 1632     100 %     21 1555 (!) 147/96 97.9 °F (36.6 °C) 60 18 99 %     21 1410 137/82 97.7 °F (36.5 °C) 65 18 100 % 5' 10\" (1.778 m) 54.4 kg (120 lb)     Temp (24hrs), Av.2 °F (36.8 °C), Min:97.6 °F (36.4 °C), Max:98.8 °F (37.1 °C)      Gen: Well-developed, restless and unsettled. Appears malnourished and underweight,   HEENT: Pink conjunctivae, hearing intact to voice, moist mucous membranes   Neck: Supple  Resp: No respiratory distress   Card: RRR, palpable distal pulse-equal bilaterally, birsk cap refill all distal digits   Abd:  non-distended  Musc: right 5th digit with uniform swelling and erythema about the entire digit, the tip is split with bridged with maturing scab. Intact flex/ext limited ROM 25% compared to LUE. Skin: No skin breakdown noted.  Skin warm, pink, dry-  Heroin Injection site left AC -  No sign of localized infection LUE  Neuro: Cranial nerves are grossly intact, no focal motor weakness, follows commands appropriately   Psych: Good insight, oriented to person, place and time, alert    Imaging Review: EXAM: XR 5TH FINGER RT MIN 2 V  INDICATION: infection, status post trauma. COMPARISON: None. FINDINGS: Three views of the right fifth finger demonstrate patchy osteolysis  involving the tuft of the distal phalanx, concerning for acute osteomyelitis. Diffuse soft tissue swelling. IMPRESSION  Findings concerning for acute osteomyelitis involving the tuft of the fifth distal phalanx. Labs:   Recent Results (from the past 24 hour(s))   CBC WITH AUTOMATED DIFF    Collection Time: 12/14/21  3:32 PM   Result Value Ref Range    WBC 14.6 (H) 4.1 - 11.1 K/uL    RBC 4.94 4.10 - 5.70 M/uL    HGB 13.9 12.1 - 17.0 g/dL    HCT 41.7 36.6 - 50.3 %    MCV 84.4 80.0 - 99.0 FL    MCH 28.1 26.0 - 34.0 PG    MCHC 33.3 30.0 - 36.5 g/dL    RDW 14.0 11.5 - 14.5 %    PLATELET 349 (H) 906 - 400 K/uL    MPV 8.3 (L) 8.9 - 12.9 FL    NRBC 0.0 0  WBC    ABSOLUTE NRBC 0.00 0.00 - 0.01 K/uL    NEUTROPHILS 77 (H) 32 - 75 %    LYMPHOCYTES 19 12 - 49 %    MONOCYTES 3 (L) 5 - 13 %    EOSINOPHILS 0 0 - 7 %    BASOPHILS 0 0 - 1 %    IMMATURE GRANULOCYTES 1 (H) 0.0 - 0.5 %    ABS. NEUTROPHILS 11.3 (H) 1.8 - 8.0 K/UL    ABS. LYMPHOCYTES 2.8 0.8 - 3.5 K/UL    ABS. MONOCYTES 0.4 0.0 - 1.0 K/UL    ABS. EOSINOPHILS 0.0 0.0 - 0.4 K/UL    ABS. BASOPHILS 0.0 0.0 - 0.1 K/UL    ABS. IMM. GRANS. 0.1 (H) 0.00 - 0.04 K/UL    DF SMEAR SCANNED      RBC COMMENTS NORMOCYTIC, NORMOCHROMIC     METABOLIC PANEL, COMPREHENSIVE    Collection Time: 12/14/21  3:32 PM   Result Value Ref Range    Sodium 138 136 - 145 mmol/L    Potassium 3.6 3.5 - 5.1 mmol/L    Chloride 105 97 - 108 mmol/L    CO2 27 21 - 32 mmol/L    Anion gap 6 5 - 15 mmol/L    Glucose 118 (H) 65 - 100 mg/dL    BUN 13 6 - 20 MG/DL    Creatinine 0.86 0.70 - 1.30 MG/DL    BUN/Creatinine ratio 15 12 - 20      GFR est AA >60 >60 ml/min/1.73m2    GFR est non-AA >60 >60 ml/min/1.73m2    Calcium 10.0 8.5 - 10.1 MG/DL    Bilirubin, total 0.4 0.2 - 1.0 MG/DL    ALT (SGPT) 21 12 - 78 U/L    AST (SGOT) 29 15 - 37 U/L    Alk.  phosphatase 70 45 - 117 U/L Protein, total 7.8 6.4 - 8.2 g/dL    Albumin 3.4 (L) 3.5 - 5.0 g/dL    Globulin 4.4 (H) 2.0 - 4.0 g/dL    A-G Ratio 0.8 (L) 1.1 - 2.2     LIPASE    Collection Time: 12/14/21  3:32 PM   Result Value Ref Range    Lipase 103 73 - 393 U/L   MAGNESIUM    Collection Time: 12/14/21  3:32 PM   Result Value Ref Range    Magnesium 2.2 1.6 - 2.4 mg/dL   TROPONIN-HIGH SENSITIVITY    Collection Time: 12/14/21  3:32 PM   Result Value Ref Range    Troponin-High Sensitivity 4 0 - 76 ng/L   POC LACTIC ACID    Collection Time: 12/14/21  3:41 PM   Result Value Ref Range    Lactic Acid (POC) 1.52 0.40 - 2.00 mmol/L         Impression:     Patient Active Problem List    Diagnosis Date Noted    Osteomyelitis of finger of right hand ([address] 12/14/2021     Active Problems:    Osteomyelitis of finger of right hand ([address] (12/14/2021)        Plan:     MRI of the left hand/5th digit with IV contrast ordered to evaluate for abscess, tenosynovitis, joint involvement. Stat scan declined by radiologist.  Steve Fox after midnight  IV abx per medical service  Kellyo started in the ED  Recommend aggressive elevation of the RUE       Dr. Matt Ham  aware and agrees with plan as above.         Valarie Rucker PA-C  [employer]

## 2021-12-15 NOTE — PROGRESS NOTES
Transition of Care Plan:    RUR: 9% - low risk  Disposition: [facility]  Follow up appointments: at [facility]  DME needed: No needs identified  Transportation at Discharge: [place] or means to access home:  Deputy IM Medicare Letter: N/A  Is patient a BCPI-A Bundle: No       If yes, was Bundle Letter given?: N/A   Is patient a  and connected with the [place]? No  If yes, was [facility] transfer form completed and [facility] notified? Caregiver Contact: [facility]  Discharge Caregiver contacted prior to discharge? At bedside    Initial Note 12:15 pm: Pt is from [facility] with deputies at bedside. Pt will be returning back to [facility] and they will be received follow up medical assessment in facility. SHERYL 12/17. Care Management Interventions  PCP Verified by CM: Yes ([address])  Mode [facility] at Discharge[facility]  Transition of Care Consult (CM Consult): Discharge Planning  Discharge Durable Medical Equipment: No  Physical Therapy Consult: No  Occupational Therapy Consult: No  Speech Therapy Consult: No  Support Systems: [facility]  Confirm Follow Up Transport:  ([address])  The Plan for Transition of Care is Related to the Following Treatment Goals : [address]  Discharge Location  Discharge Placement: Law Enforcement Custody ([facility] )    Unit CM will continue to follow.      Lorrin Mcburney, RN, BSN, [address] [place] [address] Care Manager  [phone]

## 2021-12-16 ENCOUNTER — APPOINTMENT (OUTPATIENT)
Dept: MRI IMAGING | Age: 31
DRG: 316 | End: 2021-12-16
Attending: EMERGENCY MEDICINE
Payer: MEDICAID

## 2021-12-16 ENCOUNTER — ANESTHESIA EVENT (OUTPATIENT)
Dept: SURGERY | Age: 31
DRG: 316 | End: 2021-12-16
Payer: MEDICAID

## 2021-12-16 PROBLEM — E43 SEVERE PROTEIN-CALORIE MALNUTRITION (HCC): Status: ACTIVE | Noted: 2021-12-16

## 2021-12-16 LAB
ANION GAP SERPL CALC-SCNC: 8 MMOL/L (ref 5–15)
BUN SERPL-MCNC: 14 MG/DL (ref 6–20)
BUN/CREAT SERPL: 16 (ref 12–20)
CALCIUM SERPL-MCNC: 9 MG/DL (ref 8.5–10.1)
CHLORIDE SERPL-SCNC: 100 MMOL/L (ref 97–108)
CO2 SERPL-SCNC: 24 MMOL/L (ref 21–32)
COVID-19 RAPID TEST, COVR: NOT DETECTED
CREAT SERPL-MCNC: 0.85 MG/DL (ref 0.7–1.3)
DATE LAST DOSE: NORMAL
ERYTHROCYTE [DISTWIDTH] IN BLOOD BY AUTOMATED COUNT: 14 % (ref 11.5–14.5)
GLUCOSE SERPL-MCNC: 101 MG/DL (ref 65–100)
HCT VFR BLD AUTO: 42.7 % (ref 36.6–50.3)
HGB BLD-MCNC: 14.2 G/DL (ref 12.1–17)
MCH RBC QN AUTO: 28 PG (ref 26–34)
MCHC RBC AUTO-ENTMCNC: 33.3 G/DL (ref 30–36.5)
MCV RBC AUTO: 84.2 FL (ref 80–99)
NRBC # BLD: 0 K/UL (ref 0–0.01)
NRBC BLD-RTO: 0 PER 100 WBC
PLATELET # BLD AUTO: 599 K/UL (ref 150–400)
PMV BLD AUTO: 8.5 FL (ref 8.9–12.9)
POTASSIUM SERPL-SCNC: 3.6 MMOL/L (ref 3.5–5.1)
RBC # BLD AUTO: 5.07 M/UL (ref 4.1–5.7)
REPORTED DOSE,DOSE: NORMAL UNITS
REPORTED DOSE/TIME,TMG: NORMAL
SODIUM SERPL-SCNC: 132 MMOL/L (ref 136–145)
SOURCE, COVRS: NORMAL
VANCOMYCIN TROUGH SERPL-MCNC: 9.4 UG/ML (ref 5–10)
WBC # BLD AUTO: 14.1 K/UL (ref 4.1–11.1)

## 2021-12-16 PROCEDURE — 94760 N-INVAS EAR/PLS OXIMETRY 1: CPT

## 2021-12-16 PROCEDURE — 73223 MRI JOINT UPR EXTR W/O&W/DYE: CPT

## 2021-12-16 PROCEDURE — 99223 1ST HOSP IP/OBS HIGH 75: CPT | Performed by: INTERNAL MEDICINE

## 2021-12-16 PROCEDURE — 85027 COMPLETE CBC AUTOMATED: CPT

## 2021-12-16 PROCEDURE — 74011250637 HC RX REV CODE- 250/637: Performed by: GENERAL ACUTE CARE HOSPITAL

## 2021-12-16 PROCEDURE — 74011250637 HC RX REV CODE- 250/637: Performed by: INTERNAL MEDICINE

## 2021-12-16 PROCEDURE — 74011000258 HC RX REV CODE- 258: Performed by: INTERNAL MEDICINE

## 2021-12-16 PROCEDURE — A9576 INJ PROHANCE MULTIPACK: HCPCS | Performed by: GENERAL ACUTE CARE HOSPITAL

## 2021-12-16 PROCEDURE — 74011000250 HC RX REV CODE- 250: Performed by: GENERAL ACUTE CARE HOSPITAL

## 2021-12-16 PROCEDURE — 87635 SARS-COV-2 COVID-19 AMP PRB: CPT

## 2021-12-16 PROCEDURE — 82105 ALPHA-FETOPROTEIN SERUM: CPT

## 2021-12-16 PROCEDURE — 36415 COLL VENOUS BLD VENIPUNCTURE: CPT

## 2021-12-16 PROCEDURE — 80202 ASSAY OF VANCOMYCIN: CPT

## 2021-12-16 PROCEDURE — 74011250636 HC RX REV CODE- 250/636: Performed by: INTERNAL MEDICINE

## 2021-12-16 PROCEDURE — 74011250636 HC RX REV CODE- 250/636: Performed by: GENERAL ACUTE CARE HOSPITAL

## 2021-12-16 PROCEDURE — 65660000000 HC RM CCU STEPDOWN

## 2021-12-16 PROCEDURE — 74011250636 HC RX REV CODE- 250/636: Performed by: PHYSICIAN ASSISTANT

## 2021-12-16 PROCEDURE — 80048 BASIC METABOLIC PNL TOTAL CA: CPT

## 2021-12-16 RX ORDER — KETOROLAC TROMETHAMINE 30 MG/ML
15 INJECTION, SOLUTION INTRAMUSCULAR; INTRAVENOUS EVERY 6 HOURS
Status: COMPLETED | OUTPATIENT
Start: 2021-12-16 | End: 2021-12-16

## 2021-12-16 RX ORDER — VANCOMYCIN HYDROCHLORIDE
1250 EVERY 12 HOURS
Status: DISCONTINUED | OUTPATIENT
Start: 2021-12-16 | End: 2021-12-19

## 2021-12-16 RX ORDER — GABAPENTIN 100 MG/1
100 CAPSULE ORAL 2 TIMES DAILY
Status: DISCONTINUED | OUTPATIENT
Start: 2021-12-16 | End: 2021-12-22 | Stop reason: HOSPADM

## 2021-12-16 RX ORDER — LIDOCAINE 4 G/100G
1 PATCH TOPICAL EVERY 24 HOURS
Status: DISCONTINUED | OUTPATIENT
Start: 2021-12-16 | End: 2021-12-22 | Stop reason: HOSPADM

## 2021-12-16 RX ADMIN — CEFEPIME HYDROCHLORIDE 2 G: 2 INJECTION, POWDER, FOR SOLUTION INTRAVENOUS at 09:48

## 2021-12-16 RX ADMIN — DOCUSATE SODIUM 50MG AND SENNOSIDES 8.6MG 2 TABLET: 8.6; 5 TABLET, FILM COATED ORAL at 09:48

## 2021-12-16 RX ADMIN — CEFEPIME HYDROCHLORIDE 2 G: 2 INJECTION, POWDER, FOR SOLUTION INTRAVENOUS at 17:44

## 2021-12-16 RX ADMIN — GABAPENTIN 100 MG: 100 CAPSULE ORAL at 17:44

## 2021-12-16 RX ADMIN — FAMOTIDINE 20 MG: 20 TABLET ORAL at 17:44

## 2021-12-16 RX ADMIN — LORAZEPAM 2 MG: 2 INJECTION INTRAMUSCULAR; INTRAVENOUS at 04:14

## 2021-12-16 RX ADMIN — OXYCODONE 5 MG: 5 TABLET ORAL at 23:21

## 2021-12-16 RX ADMIN — Medication 100 MG: at 09:48

## 2021-12-16 RX ADMIN — LORAZEPAM 2 MG: 2 INJECTION INTRAMUSCULAR; INTRAVENOUS at 20:10

## 2021-12-16 RX ADMIN — KETOROLAC TROMETHAMINE 15 MG: 30 INJECTION, SOLUTION INTRAMUSCULAR; INTRAVENOUS at 23:21

## 2021-12-16 RX ADMIN — Medication 10 ML: at 23:22

## 2021-12-16 RX ADMIN — FOLIC ACID 1 MG: 1 TABLET ORAL at 09:48

## 2021-12-16 RX ADMIN — ONDANSETRON 4 MG: 4 TABLET, ORALLY DISINTEGRATING ORAL at 13:19

## 2021-12-16 RX ADMIN — GABAPENTIN 100 MG: 100 CAPSULE ORAL at 14:35

## 2021-12-16 RX ADMIN — Medication 10 ML: at 05:45

## 2021-12-16 RX ADMIN — MORPHINE SULFATE 1 MG: 2 INJECTION, SOLUTION INTRAMUSCULAR; INTRAVENOUS at 09:58

## 2021-12-16 RX ADMIN — FAMOTIDINE 20 MG: 20 TABLET ORAL at 09:48

## 2021-12-16 RX ADMIN — LORAZEPAM 2 MG: 2 INJECTION INTRAMUSCULAR; INTRAVENOUS at 23:30

## 2021-12-16 RX ADMIN — GADOTERIDOL 11 ML: 279.3 INJECTION, SOLUTION INTRAVENOUS at 10:52

## 2021-12-16 RX ADMIN — MORPHINE SULFATE 1 MG: 2 INJECTION, SOLUTION INTRAMUSCULAR; INTRAVENOUS at 00:47

## 2021-12-16 RX ADMIN — OXYCODONE 5 MG: 5 TABLET ORAL at 07:46

## 2021-12-16 RX ADMIN — VANCOMYCIN HYDROCHLORIDE 1000 MG: 1 INJECTION, POWDER, LYOPHILIZED, FOR SOLUTION INTRAVENOUS at 05:45

## 2021-12-16 RX ADMIN — MORPHINE SULFATE 1 MG: 2 INJECTION, SOLUTION INTRAMUSCULAR; INTRAVENOUS at 18:32

## 2021-12-16 RX ADMIN — VANCOMYCIN HYDROCHLORIDE 1250 MG: 10 INJECTION, POWDER, LYOPHILIZED, FOR SOLUTION INTRAVENOUS at 18:32

## 2021-12-16 RX ADMIN — Medication 10 ML: at 13:19

## 2021-12-16 RX ADMIN — ACETAMINOPHEN 650 MG: 325 TABLET ORAL at 11:19

## 2021-12-16 RX ADMIN — KETOROLAC TROMETHAMINE 15 MG: 30 INJECTION, SOLUTION INTRAMUSCULAR; INTRAVENOUS at 17:44

## 2021-12-16 RX ADMIN — CEFEPIME HYDROCHLORIDE 2 G: 2 INJECTION, POWDER, FOR SOLUTION INTRAVENOUS at 02:00

## 2021-12-16 RX ADMIN — KETOROLAC TROMETHAMINE 15 MG: 30 INJECTION, SOLUTION INTRAMUSCULAR; INTRAVENOUS at 13:19

## 2021-12-16 NOTE — PROGRESS NOTES
Hospitalist Progress Note    NAME: Ana Khanna   :  1990   MRN:  [id number]       Assessment / Plan:    Acute osteomyelitis of right fifth distal phalanx  -X-ray confirms osteomyelitis. Start empiric vancomycin, cefepime. Consult orthopedics for adjustment drainage. MRI:  1. On the prior radiographs, there was a mildly displaced fracture through the  distal phalangeal tuft. The small displaced osseous fragment is not well-seen on  MRI. Abnormal signal in the fifth distal phalanx could be related to trauma  and/or infection. 2. Mild edema in the fifth middle phalanx may be reactive or related to early  osteomyelitis. 3. Diffuse subcutaneous edema in the fifth finger. No evidence of a focal drainable fluid collection. Pain control  N.p.o. after midnight for partial digit amputation in a.m. ID consult      Abdominal pain, nausea and vomiting  -CT abdomen shows liver hemangiomas but no other acute process  -CMP is normal.  Lipase level is normal.  Troponin is normal.  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     Hepatitis C  CT and reported Heterogeneous liver attenuation, nonspecific, but can be seen with  hepatitis. Additionally, several wedge-shaped peripheral regions of enhancement  in the liver are noted, which favor areas of shunting versus hemangiomas. CT liver protocol did not show any liver lesions  Check AFP  HIV neg    Patient is currently incarcerated and cannot call his family. He will need to return to [address] [facility] [address]: From [facility]  and is currently incarcerated  less than 18.5 Underweight / Body mass index is 17.22 kg/m².   Code status: Full  Prophylaxis: Lovenox  Recommended Disposition: Incarcerated   Anticipated Discharge Date:  >48 hours        Subjective:     Discussed with RN events overnight. C/o finger pain  C/o back pain, afebrile, no lower extremity numbness/weakness    Review of Systems:  Symptom Y/N Comments  Symptom Y/N Comments   Fever/Chills    Chest Pain     Poor Appetite    Edema     Cough    Abdominal Pain     Sputum    Joint Pain     SOB/ANAYA    Pruritis/Rash     Nausea/vomit    Tolerating PT/OT     Diarrhea    Tolerating Diet     Constipation    Other       PO intake: No data found. Wt Readings from Last 10 Encounters:   12/14/21 54.4 kg (120 lb)       Objective:     VITALS:   Last 24hrs VS reviewed since prior progress note. Most recent are:  Patient Vitals for the past 24 hrs:   Temp Pulse Resp BP SpO2   12/16/21 1116 98.4 °F (36.9 °C) 74 18 (!) 128/94 94 %   12/16/21 0753 98.8 °F (37.1 °C) 60 18 (!) 142/78 97 %   12/16/21 0420 98.6 °F (37 °C) 70 18 (!) 130/97 98 %   12/15/21 2056 98.8 °F (37.1 °C) (!) 54 16 (!) 150/94 99 %   12/15/21 1425 98.3 °F (36.8 °C) 61 16 (!) 143/100 98 %       Intake/Output Summary (Last 24 hours) at 12/16/2021 1333  Last data filed at 12/16/2021 1321  Gross per 24 hour   Intake 725 ml   Output 2150 ml   Net -1425 ml        I had a face to face encounter, and independently examined this patient on 12/16/2021, as outlined below:    PHYSICAL EXAM:  General:    No distress     HEENT: Atraumatic, anicteric sclerae, pink conjunctivae, MMM  Neck:  Supple, symmetrical  Lungs:   CTA. No Wheezing/Rhonchi. No rales. No tenderness. No Accessory muscle use. Heart:   Regular rhythm. No murmur. No JVD   GI/:   Soft. No tenderness. ND. BS normal  Extremities: No edema. No cyanosis. No clubbing. R 5th finger edema, redness, open wound at the tip  Skin:     Not pale. Not Jaundiced. No rashes   Psych:  Good insight. Not depressed. Not anxious or agitated. Neurologic: Alert and oriented X 4. EOMs intact. No facial asymmetry. No slurred speech.  Symmetrical strength, Sensation grossly intact. Labs     I reviewed today's most current labs and imaging studies. Pertinent labs include:  Recent Labs     12/16/21  0406 12/15/21  0421 12/14/21  1532   WBC 14.1* 17.8* 14.6*   HGB 14.2 13.2 13.9   HCT 42.7 39.4 41.7   * 569* 634*     Recent Labs     12/16/21  0406 12/15/21  0421 12/14/21  1532   * 136 138   K 3.6 3.7 3.6    104 105   CO2 24 24 27   * 113* 118*   BUN 14 12 13   CREA 0.85 0.80 0.86   CA 9.0 9.7 10.0   MG  --   --  2.2   ALB  --   --  3.4*   TBILI  --   --  0.4   ALT  --   --  21     XR 5TH FINGER RT MIN 2 V    Result Date: 12/14/2021  Findings concerning for acute osteomyelitis involving the tuft of the fifth distal phalanx. CT ABD PELV W CONT    Result Date: 12/14/2021  1. No definite acute abnormality. 2.  Heterogeneous liver attenuation, nonspecific, but can be seen with hepatitis. Additionally, several wedge-shaped peripheral regions of enhancement in the liver are noted, which favor areas of shunting versus hemangiomas. Nonemergent liver mass protocol CT or MRI can be obtained as an outpatient for definitive characterization    CT ABD PELV W WO CONT    Result Date: 12/15/2021  1. Findings within the liver are compatible with transient perfusion abnormality/shunting. A discrete mass lesion is not identified    XR CHEST PORT    Result Date: 12/14/2021  No acute findings. MRI FINGER/HAND JT RT W WO CONT    Result Date: 12/16/2021  1. On the prior radiographs, there was a mildly displaced fracture through the distal phalangeal tuft. The small displaced osseous fragment is not well-seen on MRI. Abnormal signal in the fifth distal phalanx could be related to trauma and/or infection. 2. Mild edema in the fifth middle phalanx may be reactive or related to early osteomyelitis. 3. Diffuse subcutaneous edema in the fifth finger. No evidence of a focal drainable fluid collection.       CT ABD PELV W WO CONT    Result Date: 12/15/2021  1. Findings within the liver are compatible with transient perfusion abnormality/shunting. A discrete mass lesion is not identified    MRI FINGER/HAND JT RT W WO CONT    Result Date: 12/16/2021  1. On the prior radiographs, there was a mildly displaced fracture through the distal phalangeal tuft. The small displaced osseous fragment is not well-seen on MRI. Abnormal signal in the fifth distal phalanx could be related to trauma and/or infection. 2. Mild edema in the fifth middle phalanx may be reactive or related to early osteomyelitis. 3. Diffuse subcutaneous edema in the fifth finger. No evidence of a focal drainable fluid collection.           Current Medications:     Current Facility-Administered Medications:     vancomycin (VANCOCIN) 1250 mg in  ml infusion, 1,250 mg, IntraVENous, Q12H, Christina Graf MD    ketorolac (TORADOL) injection 15 mg, 15 mg, IntraVENous, Q6H, Kajal Sctot PA-C, 15 mg at 12/16/21 1319    oxyCODONE IR (ROXICODONE) tablet 5 mg, 5 mg, Oral, Q6H PRN, Christina Graf MD, 5 mg at 12/16/21 0746    morphine injection 1 mg, 1 mg, IntraVENous, Q8H PRN, Christina Graf MD, 1 mg at 12/16/21 0958    promethazine (PHENERGAN) tablet 25 mg, 25 mg, Oral, Q6H PRN, Christina Graf MD    famotidine (PEPCID) tablet 20 mg, 20 mg, Oral, BID, Christina Graf MD, 20 mg at 12/16/21 0948    senna-docusate (PERICOLACE) 8.6-50 mg per tablet 2 Tablet, 2 Tablet, Oral, DAILY, Brianna Graf MD, 2 Tablet at 12/16/21 0948    sodium chloride (NS) flush 5-40 mL, 5-40 mL, IntraVENous, Q8H, Edwar Tobin MD, 10 mL at 12/16/21 1319    sodium chloride (NS) flush 5-40 mL, 5-40 mL, IntraVENous, PRN, Edwar Chacko MD, 10 mL at 12/15/21 0013    acetaminophen (TYLENOL) tablet 650 mg, 650 mg, Oral, Q6H PRN, 650 mg at 12/16/21 1119 **OR** acetaminophen (TYLENOL) suppository 650 mg, 650 mg, Rectal, Q6H PRN, Shantell Chacko MD    polyethylene glycol (MIRALAX) packet 17 g, 17 g, Oral, DAILY PRN, Abhishek Gannon MD    ondansetron (ZOFRAN ODT) tablet 4 mg, 4 mg, Oral, Q8H PRN, 4 mg at 12/16/21 1319 **OR** ondansetron (ZOFRAN) injection 4 mg, 4 mg, IntraVENous, Q6H PRN, Edwar Gannon MD, 4 mg at 12/15/21 2138    0.9% sodium chloride infusion, 75 mL/hr, IntraVENous, CONTINUOUS, Abhishek Tobin MD    cefepime (MAXIPIME) 2 g in 0.9% sodium chloride (MBP/ADV) 100 mL MBP, 2 g, IntraVENous, Q8H, Edwar Tobin MD, Last Rate: 200 mL/hr at 12/16/21 0948, 2 g at 12/16/21 0948    LORazepam (ATIVAN) injection 2 mg, 2 mg, IntraVENous, Q1H PRN, Nasir Coon MD, 2 mg at 12/16/21 0414    LORazepam (ATIVAN) injection 4 mg, 4 mg, IntraVENous, Q1H PRN, Abhishek Gannon MD    folic acid (FOLVITE) tablet 1 mg, 1 mg, Oral, DAILY, Edwar Tobin MD, 1 mg at 12/16/21 0948    thiamine mononitrate (B-1) tablet 100 mg, 100 mg, Oral, DAILY, Edwar Gannon MD, 100 mg at 12/16/21 0948    heparin (porcine) injection 5,000 Units, 5,000 Units, SubCUTAneous, Q12H, Abhishek Tobin MD     Procedures: see electronic medical records for all procedures/Xrays and details which were not copied into this note but were reviewed prior to creation of Plan. Reviewed most current lab test results and cultures  YES  Reviewed most current radiology test results   YES  Review and summation of old records today    NO  Reviewed patient's current orders and MAR    YES  PMH/ reviewed - no change compared to H&P  ________________________________________________________________________  Care Plan discussed with:    Comments   Patient x    Family      RN     Care Manager     Consultant  x                      Multidiciplinary team rounds were held today with , nursing, pharmacist and clinical coordinator. Patient's plan of care was discussed; medications were reviewed and discharge planning was addressed.      ________________________________________________________________________  Total NON critical care TIME:   30  Minutes    Total CRITICAL CARE TIME Spent:   Minutes non procedure based      Comments   >50% of visit spent in counseling and coordination of care x     This includes time during multidisciplinary rounds if indicated above   ________________________________________________________________________  Vinh Wilder MD

## 2021-12-16 NOTE — ANESTHESIA PREPROCEDURE EVALUATION
Relevant Problems   HEMATOLOGY   (+) Osteomyelitis of finger of right hand (HCC)       Anesthetic History   No history of anesthetic complications            Review of Systems / Medical History  Patient summary reviewed, nursing notes reviewed and pertinent labs reviewed    Pulmonary  Within defined limits                 Neuro/Psych         Psychiatric history (Polysubstance abuse (Heroin, Fentanyl, methamphetamine))    Comments: Heroin withdrawal Cardiovascular  Within defined limits                  Comments: No ECG on file   GI/Hepatic/Renal       Hepatitis: type C    Liver disease (liver hemangiomas )    Comments: Abdominal pain, nausea and vomiting Endo/Other             Other Findings   Comments: Acute osteomyelitis of right fifth distal phalanx      Currently incarcerated [facility])         Physical Exam    Airway  Mallampati: II  TM Distance: > 6 cm  Neck ROM: normal range of motion   Mouth opening: Normal     Cardiovascular  Regular rate and rhythm,  S1 and S2 normal,  no murmur, click, rub, or gallop             Dental    Dentition: Poor dentition  Comments: Multiple missing teeth, many broken off at gum line, severe decay.    Pulmonary  Breath sounds clear to auscultation               Abdominal  GI exam deferred       Other Findings            Anesthetic Plan    ASA: 3  Anesthesia type: general          Induction: Intravenous  Anesthetic plan and risks discussed with: Patient

## 2021-12-16 NOTE — PROGRESS NOTES
End of Shift Note    Bedside shift change report given to Keya PASCAL (oncoming nurse) by Ria Serrato RN (offgoing nurse). Report included the following information SBAR, Kardex, Procedure Summary, Intake/Output, MAR, Accordion and Recent Results    Shift worked:  7a-7p     Shift summary and any significant changes:     Pts right 5th finger red, swollen, scabbed area to top and side of finger. Pain meds x4 this shift. Pt had MRI and will be NPO at [facility] awaiting ortho plan. IV abx as ordered. Pt tried to induce vomiting x3 this am unsuccessfully. Guards x2 present t/o shift, shackles to wrists and ankles no skin irritation noted. Concerns for physician to address: If no procedure tomorrow please order a diet. Zone phone for oncoming shift:   [phone]       Activity:  Activity Level: Up with Assistance  Number times ambulated in hallways past shift: 0  Number of times OOB to chair past shift: 1    Cardiac:   Cardiac Monitoring: No           Access:   Current line(s): PIV     Genitourinary:   Urinary status: voiding    Respiratory:   O2 Device: None (Room air)  Chronic home O2 use?: NO  Incentive spirometer at bedside: NO     GI:     Current diet:  ADULT DIET Regular  DIET NPO  Passing flatus: YES  Tolerating current diet: YES       Pain Management:   Patient states pain is manageable on current regimen: YES    Skin:  Bill Score: 21  Interventions: increase time out of bed    Patient Safety:  Fall Score:  Total Score: 3  Interventions: gripper socks  High Fall Risk: Yes    Length of Stay:  Expected LOS: 4d 7h  Actual LOS: 1      Ria Serrato RN

## 2021-12-16 NOTE — PROGRESS NOTES
Pharmacy Antimicrobial Kinetic Dosing    Indication for Antimicrobials: OM (confirmed with x-ray)    Current Regimen of Each Antimicrobial:  Vancomycin, pharmacy to dose (Start Date ; Day # 3)  Cefepime 2g IV q8h (start ; day 3)    Previous Antimicrobial Therapy:    Goal Level: AUC: 400-600 mg/hr/Liter/day    Date Dose & Interval Measured (mcg/mL) Predicted AUC/RADHA    @ 0406 Vanc 1gm iv q12h 9.4 412                 Date & time of next level:     Dosing calculator used: [place] calculator    Significant Positive Cultures:    pbcx - NG -Prelim    Conditions for Dosing Consideration: None   Labs:  Recent Labs     21  0406 12/15/21  0421 21  1532   CREA 0.85 0.80 0.86   BUN 14 12      Recent Labs     21  0406 12/15/21  0421 21  1532   WBC 14.1* 17.8* 14.6*     Temp (24hrs), Av.6 °F (37 °C), Min:98 °F (36.7 °C), Max:99.3 °F (37.4 °C)        Creatinine Clearance (mL/min):   actual weight < IBW: CrCl (Actual Body Weight) 98.7    Impression/Plan:   Hx of Heroin abuse (possible infection source)  Blood Cx Neg so far  Continue cefepime as ordered  Vancomycin trough a little low at 9.4mcg/ml (not yet steady state) apparent   Increase Vancom to 1250mg IV q12h for predicted  and trough 13. Antimicrobial stop date TBD     Pharmacy will follow daily and adjust medications as appropriate for renal function and/or serum levels.     Thank you,  Juan Mckeon, [facility]

## 2021-12-16 NOTE — PROGRESS NOTES
Transition of Care Plan:     RUR: 9% - low risk  Disposition: [facility]  Follow up appointments: at [facility]  DME needed: No needs identified  Transportation at Discharge: [address] or means to access home:  Deputy IM Medicare Letter: N/A  Is patient a BCPI-A Bundle: No                  If yes, was Bundle Letter given?: N/A   Is patient a Prim and connected with the [place]? No  If yes, was [place] transfer form completed and VA notified? Caregiver Contact: [facility]  Discharge Caregiver contacted prior to discharge? At bedside    Initial Note 09:05 am: In review of chart, pt is not medically stable for discharge. Unit CM will continue to follow.     Deric Keller RN, BSN, [facility] Care Manager  [phone]

## 2021-12-16 NOTE — PROGRESS NOTES
Comprehensive Nutrition Assessment    Type and Reason for Visit: Initial (low BMI)    Nutrition Recommendations/Plan:   Continue Regular diet as tolerated  RD to add Ensure Enlive TID     Nutrition Assessment:   Pt admitted with osteomyelitis. PMH: hepatitis C, drug abuse, psych hx. Chart reviewed, pt lying in bed awake and alert. Plan for partial digit amputation in the AM.  Pt reports he was on a 'meth diet' when I asked him about any recent wt loss PTA. He appears to have eaten well last night, but did not eat much of his lunch tray. He is interested in trying ensure enlive (chocolate). Per visual assessment pt severe fat and muscle wasting, unable to determine if this is chronic or acute. Patient Vitals for the past 168 hrs:   % Diet Eaten   12/16/21 0751 0%   12/15/21 1752 76 - 100%   12/15/21 1135 0%   12/15/21 0758 0%       Malnutrition Assessment:  Malnutrition Status:  Severe malnutrition    Context:  Social/environmental circumstances     Findings of the 6 clinical characteristics of malnutrition:   Energy Intake:  1 - Less than 75% est energy requirements for 3 months or longer  Weight Loss:  Mild weight loass (specify amount and time period)     Body Fat Loss:  7 - Severe body fat loss, Fat overlying ribs   Muscle Mass Loss:  7 - Severe muscle mass loss, Temples (temporalis),Clavicles (pectoralis &deltoids)  Fluid Accumulation:  Unable to assess,     Strength:  Not performed       Estimated Daily Nutrient Needs:  Energy (kcal): MSJ 2000 (1515 x 1.3); Weight Used for Energy Requirements: Current  Protein (g): 65-76g (1.2-1.4gPro/kg); Weight Used for Protein Requirements: Current  Fluid (ml/day): 2000mL; Method Used for Fluid Requirements: 1 ml/kcal      Nutrition Related Findings:  Meds: cefepime, pepcid, folvite, pericolace, thiamin, vancomycin.   BM PTA      Wounds:     (traumatic wound-finger)       Current Nutrition Therapies:  DIET NPO  ADULT DIET Regular  DIET ONE TIME MESSAGE  ADULT ORAL NUTRITION SUPPLEMENT Breakfast, Lunch, Dinner; Standard High Calorie/High Protein    Anthropometric Measures:  · Height:  5' 10\" (177.8 cm)  · Current Body Wt:  54.4 kg (119 lb 14.9 oz)   · Usual Body Wt:   (UTD)     · Ideal Body Wt:  166 lbs:  72.2 %   · BMI Category:  Underweight (BMI less than 18.5)       Nutrition Diagnosis:   · Severe malnutrition related to other (specify),inadequate protein-energy intake (drug abuse) as evidenced by weight loss,severe muscle loss,severe loss of subcutaneous fat      Nutrition Interventions:   Food and/or Nutrient Delivery: Continue current diet,Start oral nutrition supplement  Nutrition Education and Counseling: No recommendations at this time  Coordination of Nutrition Care: Continue to monitor while inpatient    Goals:  Pt will consume >70% of meals/supplements in 3-5 days.        Nutrition Monitoring and Evaluation:   Behavioral-Environmental Outcomes: None identified  Food/Nutrient Intake Outcomes: Food and nutrient intake,Supplement intake  Physical Signs/Symptoms Outcomes: Biochemical data,Skin,Weight    Discharge Planning:    Continue oral nutrition supplement,Continue current diet     Electronically signed by Eun Walton RD, [facility] on 12/16/2021 at 3:21 PM    Contact: [id number]

## 2021-12-16 NOTE — PROGRESS NOTES
Ortho:    Pt has complaints of black and finger pain--- requesting increasing his pain meds  Pt states the tip of his finger is \"wiggly, loose\"   Right fifth digit w/o improvement since admission    Persistence uniform swelling and erythema of the right 5th digit. Limited A/P ROM   NO streaking or erythema of the hand  No drainage     MRI reviewed by the [facility] orthopedic team--- recommended partial digit amputation   NPO after midnight  Plan for OR Friday AM with Dr Kirk Ashton.   Continue IV ABX  BC neg x2days  CBC trending down  ID consulted by the medical service    3 doses of IV toradol 15mg added     Plan per CONSTANCE RiveraC

## 2021-12-17 ENCOUNTER — ANESTHESIA (OUTPATIENT)
Dept: SURGERY | Age: 31
DRG: 316 | End: 2021-12-17
Payer: MEDICAID

## 2021-12-17 LAB
AFP-TM SERPL-MCNC: 1.2 NG/ML (ref 0–8.3)
ANION GAP SERPL CALC-SCNC: 6 MMOL/L (ref 5–15)
BASOPHILS # BLD: 0 K/UL (ref 0–0.1)
BASOPHILS NFR BLD: 0 % (ref 0–1)
BUN SERPL-MCNC: 14 MG/DL (ref 6–20)
BUN/CREAT SERPL: 17 (ref 12–20)
CALCIUM SERPL-MCNC: 9.3 MG/DL (ref 8.5–10.1)
CHLORIDE SERPL-SCNC: 103 MMOL/L (ref 97–108)
CO2 SERPL-SCNC: 23 MMOL/L (ref 21–32)
CREAT SERPL-MCNC: 0.82 MG/DL (ref 0.7–1.3)
DIFFERENTIAL METHOD BLD: ABNORMAL
EOSINOPHIL # BLD: 0.3 K/UL (ref 0–0.4)
EOSINOPHIL NFR BLD: 2 % (ref 0–7)
ERYTHROCYTE [DISTWIDTH] IN BLOOD BY AUTOMATED COUNT: 14 % (ref 11.5–14.5)
GLUCOSE SERPL-MCNC: 112 MG/DL (ref 65–100)
HCT VFR BLD AUTO: 46 % (ref 36.6–50.3)
HGB BLD-MCNC: 15.3 G/DL (ref 12.1–17)
IMM GRANULOCYTES # BLD AUTO: 0 K/UL (ref 0–0.04)
IMM GRANULOCYTES NFR BLD AUTO: 0 % (ref 0–0.5)
LYMPHOCYTES # BLD: 3.2 K/UL (ref 0.8–3.5)
LYMPHOCYTES NFR BLD: 23 % (ref 12–49)
MCH RBC QN AUTO: 28 PG (ref 26–34)
MCHC RBC AUTO-ENTMCNC: 33.3 G/DL (ref 30–36.5)
MCV RBC AUTO: 84.2 FL (ref 80–99)
MONOCYTES # BLD: 0.4 K/UL (ref 0–1)
MONOCYTES NFR BLD: 3 % (ref 5–13)
NEUTS SEG # BLD: 10.1 K/UL (ref 1.8–8)
NEUTS SEG NFR BLD: 72 % (ref 32–75)
NRBC # BLD: 0 K/UL (ref 0–0.01)
NRBC BLD-RTO: 0 PER 100 WBC
PLATELET # BLD AUTO: 577 K/UL (ref 150–400)
PMV BLD AUTO: 8.4 FL (ref 8.9–12.9)
POTASSIUM SERPL-SCNC: 4 MMOL/L (ref 3.5–5.1)
RBC # BLD AUTO: 5.46 M/UL (ref 4.1–5.7)
RBC MORPH BLD: ABNORMAL
SODIUM SERPL-SCNC: 132 MMOL/L (ref 136–145)
WBC # BLD AUTO: 14 K/UL (ref 4.1–11.1)

## 2021-12-17 PROCEDURE — 74011250636 HC RX REV CODE- 250/636: Performed by: ANESTHESIOLOGY

## 2021-12-17 PROCEDURE — 74011250636 HC RX REV CODE- 250/636: Performed by: GENERAL ACUTE CARE HOSPITAL

## 2021-12-17 PROCEDURE — 74011000250 HC RX REV CODE- 250: Performed by: ORTHOPAEDIC SURGERY

## 2021-12-17 PROCEDURE — 77030002912 HC SUT ETHBND J&J -A: Performed by: ORTHOPAEDIC SURGERY

## 2021-12-17 PROCEDURE — 74011250636 HC RX REV CODE- 250/636: Performed by: NURSE ANESTHETIST, CERTIFIED REGISTERED

## 2021-12-17 PROCEDURE — 80048 BASIC METABOLIC PNL TOTAL CA: CPT

## 2021-12-17 PROCEDURE — 65660000000 HC RM CCU STEPDOWN

## 2021-12-17 PROCEDURE — 36415 COLL VENOUS BLD VENIPUNCTURE: CPT

## 2021-12-17 PROCEDURE — 87070 CULTURE OTHR SPECIMN AEROBIC: CPT

## 2021-12-17 PROCEDURE — 74011250637 HC RX REV CODE- 250/637: Performed by: GENERAL ACUTE CARE HOSPITAL

## 2021-12-17 PROCEDURE — 74011250637 HC RX REV CODE- 250/637: Performed by: ORTHOPAEDIC SURGERY

## 2021-12-17 PROCEDURE — 26951 AMPUTATION OF FINGER/THUMB: CPT | Performed by: ORTHOPAEDIC SURGERY

## 2021-12-17 PROCEDURE — 76010000138 HC OR TIME 0.5 TO 1 HR: Performed by: ORTHOPAEDIC SURGERY

## 2021-12-17 PROCEDURE — 76210000006 HC OR PH I REC 0.5 TO 1 HR: Performed by: ORTHOPAEDIC SURGERY

## 2021-12-17 PROCEDURE — 74011250636 HC RX REV CODE- 250/636: Performed by: ORTHOPAEDIC SURGERY

## 2021-12-17 PROCEDURE — 77030002933 HC SUT MCRYL J&J -A: Performed by: ORTHOPAEDIC SURGERY

## 2021-12-17 PROCEDURE — 76060000032 HC ANESTHESIA 0.5 TO 1 HR: Performed by: ORTHOPAEDIC SURGERY

## 2021-12-17 PROCEDURE — 2709999900 HC NON-CHARGEABLE SUPPLY

## 2021-12-17 PROCEDURE — 74011000258 HC RX REV CODE- 258: Performed by: INTERNAL MEDICINE

## 2021-12-17 PROCEDURE — 88305 TISSUE EXAM BY PATHOLOGIST: CPT

## 2021-12-17 PROCEDURE — 74011000258 HC RX REV CODE- 258: Performed by: ORTHOPAEDIC SURGERY

## 2021-12-17 PROCEDURE — 74011000250 HC RX REV CODE- 250: Performed by: NURSE ANESTHETIST, CERTIFIED REGISTERED

## 2021-12-17 PROCEDURE — 2709999900 HC NON-CHARGEABLE SUPPLY: Performed by: ORTHOPAEDIC SURGERY

## 2021-12-17 PROCEDURE — 87075 CULTR BACTERIA EXCEPT BLOOD: CPT

## 2021-12-17 PROCEDURE — 85025 COMPLETE CBC W/AUTO DIFF WBC: CPT

## 2021-12-17 PROCEDURE — 87077 CULTURE AEROBIC IDENTIFY: CPT

## 2021-12-17 PROCEDURE — 87186 SC STD MICRODIL/AGAR DIL: CPT

## 2021-12-17 PROCEDURE — 88311 DECALCIFY TISSUE: CPT

## 2021-12-17 PROCEDURE — 77030013079 HC BLNKT BAIR HGGR 3M -A: Performed by: NURSE ANESTHETIST, CERTIFIED REGISTERED

## 2021-12-17 PROCEDURE — 77030031139 HC SUT VCRL2 J&J -A: Performed by: ORTHOPAEDIC SURGERY

## 2021-12-17 PROCEDURE — 0X6V0Z2 DETACHMENT AT RIGHT LITTLE FINGER, MID, OPEN APPROACH: ICD-10-PCS | Performed by: ORTHOPAEDIC SURGERY

## 2021-12-17 PROCEDURE — 77030020754 HC CUF TRNQT 2BLA STRY -B: Performed by: ORTHOPAEDIC SURGERY

## 2021-12-17 PROCEDURE — 74011250636 HC RX REV CODE- 250/636: Performed by: INTERNAL MEDICINE

## 2021-12-17 PROCEDURE — 99221 1ST HOSP IP/OBS SF/LOW 40: CPT | Performed by: ORTHOPAEDIC SURGERY

## 2021-12-17 PROCEDURE — 77030020143 HC AIRWY LARYN INTUB CGAS -A: Performed by: NURSE ANESTHETIST, CERTIFIED REGISTERED

## 2021-12-17 PROCEDURE — 77030002916 HC SUT ETHLN J&J -A: Performed by: ORTHOPAEDIC SURGERY

## 2021-12-17 RX ORDER — FENTANYL CITRATE 50 UG/ML
INJECTION, SOLUTION INTRAMUSCULAR; INTRAVENOUS
Status: DISPENSED
Start: 2021-12-17 | End: 2021-12-18

## 2021-12-17 RX ORDER — ALPRAZOLAM 0.5 MG/1
0.5 TABLET ORAL
Status: COMPLETED | OUTPATIENT
Start: 2021-12-17 | End: 2021-12-19

## 2021-12-17 RX ORDER — SODIUM CHLORIDE, SODIUM LACTATE, POTASSIUM CHLORIDE, CALCIUM CHLORIDE 600; 310; 30; 20 MG/100ML; MG/100ML; MG/100ML; MG/100ML
25 INJECTION, SOLUTION INTRAVENOUS CONTINUOUS
Status: DISCONTINUED | OUTPATIENT
Start: 2021-12-17 | End: 2021-12-17 | Stop reason: HOSPADM

## 2021-12-17 RX ORDER — SODIUM CHLORIDE 0.9 % (FLUSH) 0.9 %
5-40 SYRINGE (ML) INJECTION AS NEEDED
Status: DISCONTINUED | OUTPATIENT
Start: 2021-12-17 | End: 2021-12-19

## 2021-12-17 RX ORDER — KETOROLAC TROMETHAMINE 30 MG/ML
INJECTION, SOLUTION INTRAMUSCULAR; INTRAVENOUS AS NEEDED
Status: DISCONTINUED | OUTPATIENT
Start: 2021-12-17 | End: 2021-12-17 | Stop reason: HOSPADM

## 2021-12-17 RX ORDER — FENTANYL CITRATE 50 UG/ML
25 INJECTION, SOLUTION INTRAMUSCULAR; INTRAVENOUS
Status: DISCONTINUED | OUTPATIENT
Start: 2021-12-17 | End: 2021-12-17 | Stop reason: HOSPADM

## 2021-12-17 RX ORDER — LIDOCAINE HYDROCHLORIDE 10 MG/ML
0.1 INJECTION, SOLUTION EPIDURAL; INFILTRATION; INTRACAUDAL; PERINEURAL AS NEEDED
Status: DISCONTINUED | OUTPATIENT
Start: 2021-12-17 | End: 2021-12-17 | Stop reason: HOSPADM

## 2021-12-17 RX ORDER — LIDOCAINE HYDROCHLORIDE 20 MG/ML
INJECTION, SOLUTION EPIDURAL; INFILTRATION; INTRACAUDAL; PERINEURAL AS NEEDED
Status: DISCONTINUED | OUTPATIENT
Start: 2021-12-17 | End: 2021-12-17 | Stop reason: HOSPADM

## 2021-12-17 RX ORDER — HYDROMORPHONE HYDROCHLORIDE 1 MG/ML
0.2 INJECTION, SOLUTION INTRAMUSCULAR; INTRAVENOUS; SUBCUTANEOUS
Status: DISCONTINUED | OUTPATIENT
Start: 2021-12-17 | End: 2021-12-17 | Stop reason: HOSPADM

## 2021-12-17 RX ORDER — SODIUM CHLORIDE 0.9 % (FLUSH) 0.9 %
5-40 SYRINGE (ML) INJECTION AS NEEDED
Status: DISCONTINUED | OUTPATIENT
Start: 2021-12-17 | End: 2021-12-17 | Stop reason: HOSPADM

## 2021-12-17 RX ORDER — KETOROLAC TROMETHAMINE 30 MG/ML
30 INJECTION, SOLUTION INTRAMUSCULAR; INTRAVENOUS
Status: DISCONTINUED | OUTPATIENT
Start: 2021-12-17 | End: 2021-12-22 | Stop reason: HOSPADM

## 2021-12-17 RX ORDER — ONDANSETRON 2 MG/ML
INJECTION INTRAMUSCULAR; INTRAVENOUS AS NEEDED
Status: DISCONTINUED | OUTPATIENT
Start: 2021-12-17 | End: 2021-12-17 | Stop reason: HOSPADM

## 2021-12-17 RX ORDER — PROPOFOL 10 MG/ML
INJECTION, EMULSION INTRAVENOUS AS NEEDED
Status: DISCONTINUED | OUTPATIENT
Start: 2021-12-17 | End: 2021-12-17 | Stop reason: HOSPADM

## 2021-12-17 RX ORDER — DIPHENHYDRAMINE HYDROCHLORIDE 50 MG/ML
12.5 INJECTION, SOLUTION INTRAMUSCULAR; INTRAVENOUS AS NEEDED
Status: DISCONTINUED | OUTPATIENT
Start: 2021-12-17 | End: 2021-12-17 | Stop reason: HOSPADM

## 2021-12-17 RX ORDER — MIDAZOLAM HYDROCHLORIDE 1 MG/ML
INJECTION, SOLUTION INTRAMUSCULAR; INTRAVENOUS AS NEEDED
Status: DISCONTINUED | OUTPATIENT
Start: 2021-12-17 | End: 2021-12-17 | Stop reason: HOSPADM

## 2021-12-17 RX ORDER — SODIUM CHLORIDE 0.9 % (FLUSH) 0.9 %
5-40 SYRINGE (ML) INJECTION EVERY 8 HOURS
Status: DISCONTINUED | OUTPATIENT
Start: 2021-12-17 | End: 2021-12-17 | Stop reason: HOSPADM

## 2021-12-17 RX ORDER — SODIUM CHLORIDE, SODIUM LACTATE, POTASSIUM CHLORIDE, CALCIUM CHLORIDE 600; 310; 30; 20 MG/100ML; MG/100ML; MG/100ML; MG/100ML
INJECTION, SOLUTION INTRAVENOUS
Status: DISCONTINUED | OUTPATIENT
Start: 2021-12-17 | End: 2021-12-17 | Stop reason: HOSPADM

## 2021-12-17 RX ORDER — BUPIVACAINE HYDROCHLORIDE 5 MG/ML
INJECTION, SOLUTION EPIDURAL; INTRACAUDAL AS NEEDED
Status: DISCONTINUED | OUTPATIENT
Start: 2021-12-17 | End: 2021-12-17 | Stop reason: HOSPADM

## 2021-12-17 RX ORDER — DEXMEDETOMIDINE HYDROCHLORIDE 100 UG/ML
INJECTION, SOLUTION INTRAVENOUS AS NEEDED
Status: DISCONTINUED | OUTPATIENT
Start: 2021-12-17 | End: 2021-12-17 | Stop reason: HOSPADM

## 2021-12-17 RX ORDER — SODIUM CHLORIDE 0.9 % (FLUSH) 0.9 %
5-40 SYRINGE (ML) INJECTION EVERY 8 HOURS
Status: DISCONTINUED | OUTPATIENT
Start: 2021-12-17 | End: 2021-12-19

## 2021-12-17 RX ORDER — NALOXONE HYDROCHLORIDE 0.4 MG/ML
0.4 INJECTION, SOLUTION INTRAMUSCULAR; INTRAVENOUS; SUBCUTANEOUS AS NEEDED
Status: DISCONTINUED | OUTPATIENT
Start: 2021-12-17 | End: 2021-12-22 | Stop reason: HOSPADM

## 2021-12-17 RX ADMIN — LORAZEPAM 2 MG: 2 INJECTION INTRAMUSCULAR; INTRAVENOUS at 18:48

## 2021-12-17 RX ADMIN — Medication 10 ML: at 14:47

## 2021-12-17 RX ADMIN — Medication 10 ML: at 18:48

## 2021-12-17 RX ADMIN — KETOROLAC TROMETHAMINE 30 MG: 30 INJECTION, SOLUTION INTRAMUSCULAR; INTRAVENOUS at 21:18

## 2021-12-17 RX ADMIN — CEFEPIME HYDROCHLORIDE 2 G: 2 INJECTION, POWDER, FOR SOLUTION INTRAVENOUS at 02:10

## 2021-12-17 RX ADMIN — MORPHINE SULFATE 1 MG: 2 INJECTION, SOLUTION INTRAMUSCULAR; INTRAVENOUS at 14:47

## 2021-12-17 RX ADMIN — KETOROLAC TROMETHAMINE 30 MG: 30 INJECTION, SOLUTION INTRAMUSCULAR; INTRAVENOUS at 12:27

## 2021-12-17 RX ADMIN — Medication 10 ML: at 21:52

## 2021-12-17 RX ADMIN — LORAZEPAM 2 MG: 2 INJECTION INTRAMUSCULAR; INTRAVENOUS at 22:35

## 2021-12-17 RX ADMIN — VANCOMYCIN HYDROCHLORIDE 1250 MG: 10 INJECTION, POWDER, LYOPHILIZED, FOR SOLUTION INTRAVENOUS at 06:44

## 2021-12-17 RX ADMIN — DEXMEDETOMIDINE HYDROCHLORIDE 10 MCG: 100 INJECTION, SOLUTION, CONCENTRATE INTRAVENOUS at 11:46

## 2021-12-17 RX ADMIN — PROPOFOL 200 MG: 10 INJECTION, EMULSION INTRAVENOUS at 11:46

## 2021-12-17 RX ADMIN — ONDANSETRON HYDROCHLORIDE 4 MG: 2 INJECTION, SOLUTION INTRAMUSCULAR; INTRAVENOUS at 12:00

## 2021-12-17 RX ADMIN — ONDANSETRON 4 MG: 2 INJECTION INTRAMUSCULAR; INTRAVENOUS at 12:58

## 2021-12-17 RX ADMIN — Medication 10 ML: at 08:21

## 2021-12-17 RX ADMIN — OXYCODONE 5 MG: 5 TABLET ORAL at 15:53

## 2021-12-17 RX ADMIN — LORAZEPAM 2 MG: 2 INJECTION INTRAMUSCULAR; INTRAVENOUS at 15:54

## 2021-12-17 RX ADMIN — FENTANYL CITRATE 25 MCG: 50 INJECTION, SOLUTION INTRAMUSCULAR; INTRAVENOUS at 13:06

## 2021-12-17 RX ADMIN — SODIUM CHLORIDE, POTASSIUM CHLORIDE, SODIUM LACTATE AND CALCIUM CHLORIDE: 600; 310; 30; 20 INJECTION, SOLUTION INTRAVENOUS at 11:41

## 2021-12-17 RX ADMIN — PROPOFOL 25 MG: 10 INJECTION, EMULSION INTRAVENOUS at 12:19

## 2021-12-17 RX ADMIN — OXYCODONE 5 MG: 5 TABLET ORAL at 08:11

## 2021-12-17 RX ADMIN — MORPHINE SULFATE 1 MG: 2 INJECTION, SOLUTION INTRAMUSCULAR; INTRAVENOUS at 04:45

## 2021-12-17 RX ADMIN — Medication 10 ML: at 06:44

## 2021-12-17 RX ADMIN — MIDAZOLAM HYDROCHLORIDE 2 MG: 1 INJECTION, SOLUTION INTRAMUSCULAR; INTRAVENOUS at 11:40

## 2021-12-17 RX ADMIN — FAMOTIDINE 20 MG: 20 TABLET ORAL at 17:20

## 2021-12-17 RX ADMIN — ACETAMINOPHEN 650 MG: 325 TABLET ORAL at 18:52

## 2021-12-17 RX ADMIN — GABAPENTIN 100 MG: 100 CAPSULE ORAL at 17:19

## 2021-12-17 RX ADMIN — FENTANYL CITRATE 25 MCG: 50 INJECTION, SOLUTION INTRAMUSCULAR; INTRAVENOUS at 13:09

## 2021-12-17 RX ADMIN — OXYCODONE 5 MG: 5 TABLET ORAL at 22:34

## 2021-12-17 RX ADMIN — LIDOCAINE HYDROCHLORIDE 100 MG: 20 INJECTION, SOLUTION INTRAVENOUS at 11:45

## 2021-12-17 RX ADMIN — ALPRAZOLAM 0.5 MG: 0.5 TABLET ORAL at 21:18

## 2021-12-17 RX ADMIN — VANCOMYCIN HYDROCHLORIDE 1250 MG: 10 INJECTION, POWDER, LYOPHILIZED, FOR SOLUTION INTRAVENOUS at 18:09

## 2021-12-17 RX ADMIN — Medication 10 ML: at 13:34

## 2021-12-17 RX ADMIN — CEFEPIME HYDROCHLORIDE 2 G: 2 INJECTION, POWDER, FOR SOLUTION INTRAVENOUS at 10:04

## 2021-12-17 RX ADMIN — LORAZEPAM 2 MG: 2 INJECTION INTRAMUSCULAR; INTRAVENOUS at 08:17

## 2021-12-17 RX ADMIN — CEFEPIME HYDROCHLORIDE 2 G: 2 INJECTION, POWDER, FOR SOLUTION INTRAVENOUS at 17:20

## 2021-12-17 NOTE — ANESTHESIA POSTPROCEDURE EVALUATION
Procedure(s):  RIGHT FIFTH DIGIT INCISION AND DRAINAGE, PARTIAL AMPUTATION. MAC, total IV anesthesia    Anesthesia Post Evaluation        Patient location during evaluation: PACU  Note status: Adequate. Level of consciousness: responsive to verbal stimuli and sleepy but conscious  Pain management: satisfactory to patient  Airway patency: patent  Anesthetic complications: no  Cardiovascular status: acceptable  Respiratory status: acceptable  Hydration status: acceptable  Comments: +Post-Anesthesia Evaluation and Assessment    Patient: Dora Cain MRN: [id number]  SSN: [id number]   YOB: 1990  Age: 36 y.o. Sex: male      Cardiovascular Function/Vital Signs    BP (!) 136/109   Pulse 74   Temp 36.4 °C (97.5 °F)   Resp 17   Ht 5' 10\" (1.778 m)   Wt 54.4 kg (120 lb)   SpO2 100%   BMI 17.22 kg/m²     Patient is status post Procedure(s):  RIGHT FIFTH DIGIT INCISION AND DRAINAGE, PARTIAL AMPUTATION. Nausea/Vomiting: Controlled. Postoperative hydration reviewed and adequate. Pain:  Pain Scale 1: Numeric (0 - 10) (12/17/21 1300)  Pain Intensity 1: 10 (12/17/21 1300)   Managed. Neurological Status:   Neuro (WDL): Exceptions to WDL (12/17/21 1230)   At baseline. Mental Status and Level of Consciousness: Arousable. Pulmonary Status:   O2 Device: None (Room air) (12/17/21 1300)   Adequate oxygenation and airway patent. Complications related to anesthesia: None    Post-anesthesia assessment completed. No concerns. Signed By: Brendan Rader MD    12/17/2021  Post anesthesia nausea and vomiting:  controlled      INITIAL Post-op Vital signs:   Vitals Value Taken Time   /109 12/17/21 1300   Temp 36.4 °C (97.5 °F) 12/17/21 1231   Pulse 75 12/17/21 1307   Resp 15 12/17/21 1307   SpO2 100 % 12/17/21 1307   Vitals shown include unvalidated device data.

## 2021-12-17 NOTE — PERIOP NOTES
TRANSFER - IN REPORT:    Verbal report received from Holland Mart RN(name) on [facility]  being received from Surgical Telemetry(unit) for ordered procedure      Report consisted of patients Situation, Background, Assessment and   Recommendations(SBAR). Information from the following report(s) SBAR, Kardex, Intake/Output, MAR and Recent Results was reviewed with the receiving nurse. Opportunity for questions and clarification was provided. Assessment completed upon patients arrival to unit and care assumed.

## 2021-12-17 NOTE — PROGRESS NOTES
Transition of Care Plan:     RUR: 9% - low risk  Disposition: [facility]  Follow up appointments: at [facility]  DME needed: No needs identified  Transportation at [address] or means to access home:  [place]      IM Medicare Letter: N/A  Is patient a BCPI-A Bundle: No                  If yes, was Bundle Letter given?: N/A   Is patient a  and connected with the [address] [place] [address] Road  If yes, was [employer] transfer form completed and VA notified? [facility]  Discharge Caregiver contacted prior to [facility] bedside    Updated Note 4:20 pm: CM contacted Nurse at [facility] ([phone] ext [phone]) to inquired if they can give IV antibiotics in skilled nursing. CM spoke to a nurse and asked if they were able to give IV antibiotics. She said \"they have used the antibiotics that were little balls. \"  Then, she transferred CM to supervisor. It went to . CM will follow up.      Initial Note 09:25 am: In review of chart, pt is not medically stable for discharge. Plan: surgery. Unit CM will continue to follow.      Deric Keller RN, BSN, [facility] Care Manager  [phone]

## 2021-12-17 NOTE — BRIEF OP NOTE
Brief Postoperative Note    Patient: Bailee Hui  YOB: 1990  MRN: [id number]    Date of Procedure: 12/17/2021     Pre-Op Diagnosis: OPEN FRACTURE RIGHT 5TH FINGER with osteomyelitis    Post-Op Diagnosis: Same as preoperative diagnosis.       Procedure(s):  RIGHT FIFTH DIGIT INCISION AND DRAINAGE, PARTIAL AMPUTATION distal phalanx and distal portion of middle phalanx    Surgeon(s):  Jeannie Teran DO    Surgical Assistant: None    Anesthesia: General     Estimated Blood Loss (mL): Minimal    Complications: None    Specimens:   ID Type Source Tests Collected by Time Destination   1 : Right fifth finger tip amputation Preservative Finger  Jeannie Teran DO 12/17/2021 1158 Pathology   1 : Right fifth finger Tissue Finger CULTURE, ANAEROBIC, AEROBIC CULTURE + GRAM Ade Abel DO 12/17/2021 1157 Microbiology        Implants: * No implants in log *    Drains: * No LDAs found *    Findings: Infected distal phalanx open fracture with osteomyelitis    Electronically Signed by Machelle Long DO on 12/17/2021 at 12:30 PM

## 2021-12-17 NOTE — PROGRESS NOTES
End of Shift Note    Bedside shift change report given to Toshia Johnson RN (oncoming nurse) by Lorraine Dale RN (offgoing nurse). Report included the following information SBAR, Kardex, Intake/Output, MAR and Recent Results    Shift worked:  3491-5818     Shift summary and any significant changes:     Patient medicated for pain as needed. Consent form signed for procedure today. Pt has been NPO since midnight. Guards present x2 throughout the night. Ativan given twice for anxiety/restlessness. No skin breakdown noted on ankles. Pt voiding using bedside urinal.      Concerns for physician to address:       Zone phone for oncoming shift:   [phone]       Activity:  Activity Level: Up with Assistance  Number times ambulated in hallways past shift: 0  Number of times OOB to chair past shift: 0    Cardiac:   Cardiac Monitoring: No           Access:   Current line(s): PIV     Genitourinary:   Urinary status: voiding    Respiratory:   O2 Device: None (Room air)  Chronic home O2 use?: NO  Incentive spirometer at bedside: N/A     GI:     Current diet:  DIET NPO  DIET ONE TIME MESSAGE  ADULT ORAL NUTRITION SUPPLEMENT Breakfast, Lunch, Dinner; Standard High Calorie/High Protein  Passing flatus: YES  Tolerating current diet: YES       Pain Management:   Patient states pain is manageable on current regimen: YES    Skin:  Bill Score: 21  Interventions: increase time out of bed    Patient Safety:  Fall Score:  Total Score: 2  Interventions: gripper socks  High Fall Risk: Yes    Length of Stay:  Expected LOS: 4d 7h  Actual LOS: 3      Camilla Mcgregor RN

## 2021-12-17 NOTE — PERIOP NOTES
Handoff Report from Operating Room to PACU    Report received from DOE Smiley RN and PETERSON Kaye CRNA regarding oJvon Urrutia. Surgeon(s):  Christian Ramirez DO  And Procedure(s) (LRB):  RIGHT FIFTH DIGIT INCISION AND DRAINAGE, PARTIAL AMPUTATION (Right)  confirmed   with dressings discussed. Anesthesia type, drugs, patient history, complications, estimated blood loss, vital signs, intake and output, and last pain medication, lines and temperature were reviewed. TRANSFER - OUT REPORT:    Verbal report given to Frannie Drummond RN(name) on Jovon Urrutia  being transferred to [facility](unit) for routine post - op       Report consisted of patients Situation, Background, Assessment and   Recommendations(SBAR). Information from the following report(s) SBAR, OR Summary, Procedure Summary, Intake/Output, MAR and Cardiac Rhythm NSR was reviewed with the receiving nurse. Opportunity for questions and clarification was provided.       Patient transported with:   Registered Nurse  Tech

## 2021-12-17 NOTE — PROGRESS NOTES
End of Shift Note    Bedside shift change report given to  Symone Hernandez RN (oncoming nurse) by Sharyn Mora RN (offgoing nurse). Report included the following information SBAR, Kardex, Intake/Output, MAR and Recent Results    Shift worked:  7a to 7p     Shift summary and any significant changes:     Surgery today right 5th finger. Small amount of old drainage noted at tip of dressing. Remains with guards at bedside Highest CIWA was 6 today. Diet advanced to Regular. CBC in am   Concerns for physician to address: Pain control      Zone phone for oncoming shift:   [phone]       Activity:  Activity Level: Up with Assistance  Number times ambulated in hallways past shift: 0  Number of times OOB to chair past shift: 0    Cardiac:   Cardiac Monitoring: No           Access:   Current line(s): PIV     Genitourinary:   Urinary status: voiding    Respiratory:   O2 Device: None (Room air)  Chronic home O2 use?: NO  Incentive spirometer at bedside: N/A     GI:     Current diet:  DIET NPO  DIET ONE TIME MESSAGE  ADULT ORAL NUTRITION SUPPLEMENT Breakfast, Lunch, Dinner; Standard High Calorie/High Protein  Passing flatus: YES  Tolerating current diet: YES       Pain Management:   Patient states pain is manageable on current regimen: YES    Skin:  Bill Score: 21  Interventions: increase time out of bed    Patient Safety:  Fall Score:  Total Score: 2  Interventions: gripper socks  High Fall Risk: Yes    Length of Stay:  Expected LOS: 4d 7h  Actual LOS: [address], RN

## 2021-12-17 NOTE — PROGRESS NOTES
Message sent to Dr. Jose Partida re:  Pain medicine doesn't seem to be adequate for his amputated finger.

## 2021-12-17 NOTE — PROGRESS NOTES
Problem: Falls - Risk of  Goal: *Absence of Falls  Description: Document Selvin Tulio Fall Risk and appropriate interventions in the flowsheet.   Outcome: Progressing Towards Goal  Note: Fall Risk Interventions:  Mobility Interventions: Communicate number of staff needed for ambulation/transfer         Medication Interventions: Patient to call before getting OOB,Teach patient to arise slowly    Elimination Interventions: Bed/chair exit alarm              Problem: Patient Education: Go to Patient Education Activity  Goal: Patient/Family Education  Outcome: Progressing Towards Goal

## 2021-12-17 NOTE — CONSULTS
Infectious Disease Consult    Date of Consultation:  12/16/21  Date of Admission: 12/14/2021   Referring Physician: Dr Moncada Apo:     Patient is a 36 y.o. male who is being seen for -  Finger infection        IMPRESSION:     -Acute osteomyelitis of right 5th distal phalanx  H/o local trauma  For partial amputation of digit  On empiric vancomycin, cefepime IV.    -S/p abdominal pain, N,V secondary to drug withdrawal  H/o heroin, fentanyl drug abuse    -Chronic hep C  -HIV negative    -History of polysubstance abuse    -Patient is currently incarcerated       PLAN:        -Continue empiric vancomycin and cefepime IV  Pharmacy on consult for dosing target vancomycin trough 15-20, monitor creatinine  -Plan is for partial amputation  Ortho to send bone cultures-aerobic anaerobic please  -Please contact  ID on call with questions/concerns. Ana Khanna is a 36 y.o. male presented to the ED with cc of right finger pain and withdrawal.  The patient was booked into the [facility] long term day PTA. Patient stated that he   slammed his right 5th finger with a hammer 3 weeks ago. He stated he developed blood blister which he burst, and continued to have pain. He is right-hand dominant. Pain is 9 out of 10 in severity, is worse with palpation. No history of fever or chills. Patient stated he believed he was withdrawing from fentanyl and heroin. He had vomited multiple times today, and had abdominal pain with each episode. Polysubstance abuse-  Regular fentanyl, heroin user. Accompanied by Jun Ghosh- pt incarcerated yesterday. Per chart history see hepatitis C positive, HIV testing done-negative  WBC 14.6 at time of admission. Creatinine 0.86  X-ray fifth finger-12/14  INDINGS: Three views of the right fifth finger demonstrate patchy osteolysis  involving the tuft of the distal phalanx, concerning for acute osteomyelitis.   Diffuse soft tissue swelling.     IMPRESSION  Findings concerning for acute osteomyelitis involving the tuft of  the fifth distal phalanx. MRI finger-12/16  FINDINGS: Bone marrow: On the prior radiographs, there was a mildly displaced  fracture through the distal phalangeal tuft with a small bone fragment distally. This bone fragment is not well identified on the current MRI. There is edema and  decreased T1 signal in the fifth distal phalanx. Mild edema is seen in the fifth  middle phalanx.     Joint fluid:  No significant joint effusion.     Tendons: Intact.     Muscles: Within normal limits.     Neurovascular bundles: Within normal limits.     Articular cartilage:Intact without focal osteochondral lesion.     Soft tissue mass: No mass. There is subcutaneous edema surrounding the fifth  finger. No evidence of a focal drainable fluid collection.     IMPRESSION  1. On the prior radiographs, there was a mildly displaced fracture through the  distal phalangeal tuft. The small displaced osseous fragment is not well-seen on  MRI. Abnormal signal in the fifth distal phalanx could be related to trauma  and/or infection. 2. Mild edema in the fifth middle phalanx may be reactive or related to early  osteomyelitis. 3. Diffuse subcutaneous edema in the fifth finger. No evidence of a focal  drainable fluid collection. Patient has been seen by Ortho on consult. Plan is for debridement partial amputation tomorrow. D/w Ortho PA. Send wounds specimen for aerobic and a anaerobic cultures please  Patient seen today. States he has significant pain that is not controlled with pain meds. Finger appears dark ,discolored. No drainage. Patient Active Problem List   Diagnosis Code    Osteomyelitis of finger of right hand ([address] M86.9    Severe protein-calorie malnutrition ([facility] [address] E43     Past Medical History:   Diagnosis Date    No pertinent past medical history       History reviewed. No pertinent family history.    Social History     Tobacco Use    Smoking status: Not on file    Smokeless tobacco: Not on file   Substance Use Topics    Alcohol use: Not on file     History reviewed. No pertinent surgical history. Prior to Admission medications    Not on File     Allergies   Allergen Reactions    Codeine Itching        Review of Systems:  A comprehensive review of systems was negative except for that written in the History of Present Illness. 14 point review of systems obtained . All other systems negative    Objective:   Blood pressure (!) 142/80, pulse 60, temperature 98.7 °F (37.1 °C), resp. rate 17, height 5' 10\" (1.778 m), weight 120 lb (54.4 kg), SpO2 99 %.   Temp (24hrs), Av.5 °F (36.9 °C), Min:98.4 °F (36.9 °C), Max:98.7 °F (37.1 °C)    Current Facility-Administered Medications   Medication Dose Route Frequency    vancomycin (VANCOCIN) 1250 mg in  ml infusion  1,250 mg IntraVENous Q12H    lidocaine 4 % patch 1 Patch  1 Patch TransDERmal Q24H    gabapentin (NEURONTIN) capsule 100 mg  100 mg Oral BID    oxyCODONE IR (ROXICODONE) tablet 5 mg  5 mg Oral Q6H PRN    morphine injection 1 mg  1 mg IntraVENous Q8H PRN    promethazine (PHENERGAN) tablet 25 mg  25 mg Oral Q6H PRN    famotidine (PEPCID) tablet 20 mg  20 mg Oral BID    senna-docusate (PERICOLACE) 8.6-50 mg per tablet 2 Tablet  2 Tablet Oral DAILY    sodium chloride (NS) flush 5-40 mL  5-40 mL IntraVENous Q8H    sodium chloride (NS) flush 5-40 mL  5-40 mL IntraVENous PRN    acetaminophen (TYLENOL) tablet 650 mg  650 mg Oral Q6H PRN    Or    acetaminophen (TYLENOL) suppository 650 mg  650 mg Rectal Q6H PRN    polyethylene glycol (MIRALAX) packet 17 g  17 g Oral DAILY PRN    ondansetron (ZOFRAN ODT) tablet 4 mg  4 mg Oral Q8H PRN    Or    ondansetron (ZOFRAN) injection 4 mg  4 mg IntraVENous Q6H PRN    cefepime (MAXIPIME) 2 g in 0.9% sodium chloride (MBP/ADV) 100 mL MBP  2 g IntraVENous Q8H    LORazepam (ATIVAN) injection 2 mg  2 mg IntraVENous Q1H PRN    LORazepam (ATIVAN) injection 4 mg  4 mg IntraVENous T6S PRN    folic acid (FOLVITE) tablet 1 mg  1 mg Oral DAILY    thiamine mononitrate (B-1) tablet 100 mg  100 mg Oral DAILY    [Held by provider] heparin (porcine) injection 5,000 Units  5,000 Units SubCUTAneous Q12H        Exam:    General:  Awake, cooperative, disheveled   Eyes:  Sclera anicteric. Pupils equally round and reactive to light. Mouth/Throat: Mucous membranes normal, oral pharynx clear   Neck: Supple   Lungs:   Clear to auscultation bilaterally, good effort   CV:  Regular rate and rhythm,no murmur, click, rub or gallop   Abdomen:   Soft, non-tender. bowel sounds normal. non-distended   Extremities: No  edema   Skin: Skin color, texture, turgor normal. no acute rash or lesions   Lymph nodes: Cervical and supraclavicular normal   Musculoskeletal: Swelling, r.5th finger distal phalanx-dark discoloration, no drainage   Lines/Devices:  Intact, no erythema, drainage or tenderness   Psych: Alert and oriented, normal mood affect . Data Reviewed:   CBC:   Recent Labs     12/17/21  0435 12/16/21  0406 12/15/21  0421 12/14/21  1532 12/14/21  1532   WBC 14.0* 14.1* 17.8*   < > 14.6*   RBC 5.46 5.07 4.70   < > 4.94   HGB 15.3 14.2 13.2   < > 13.9   HCT 46.0 42.7 39.4   < > 41.7   * 599* 569*   < > 634*   GRANS 72  --   --   --  77*   LYMPH 23  --   --   --  19   EOS 2  --   --   --  0    < > = values in this interval not displayed.      CMP:   Recent Labs     12/17/21 0435 12/16/21 0406 12/15/21  0421 12/14/21  1532 12/14/21  1532   * 101* 113*   < > 118*   * 132* 136   < > 138   K 4.0 3.6 3.7   < > 3.6    100 104   < > 105   CO2 23 24 24   < > 27   BUN 14 14 12   < > 13   CREA 0.82 0.85 0.80   < > 0.86   CA 9.3 9.0 9.7   < > 10.0   AGAP 6 8 8   < > 6   BUCR 17 16 15   < > 15   AP  --   --   --   --  70   TP  --   --   --   --  7.8   ALB  --   --   --   --  3.4*   GLOB  --   --   --   --  4.4*   AGRAT  --   --   --   --  0.8*    < > = values in this interval not displayed. Lab Results   Component Value Date/Time    Culture result: NO GROWTH 3 DAYS 12/14/2021 03:32 PM          XR Results (most recent)reviewed:  [facility]    XR 5TH FINGER RT MIN 2 V    Narrative  EXAM: XR 5TH FINGER RT MIN 2 V    INDICATION: infection, status post trauma. COMPARISON: None. FINDINGS: Three views of the right fifth finger demonstrate patchy osteolysis  involving the tuft of the distal phalanx, concerning for acute osteomyelitis. Diffuse soft tissue swelling. Impression  Findings concerning for acute osteomyelitis involving the tuft of  the fifth distal phalanx. ICD-10-CM ICD-9-CM    1. Osteomyelitis of finger of right hand ([facility])  M86.9 730.24    2. Opiate withdrawal ([facility])  F11.23 292.0      304.00      Antibiotics  Vancomycin-12/14  Cefepime-12/14    I have discussed the diagnosis with the patient and the intended plan as seen in the above orders. I have discussed medication side effects and warnings with the patient as well.     Reviewed test results at length with patient    Signed By: Modesta Guevara MD FACP

## 2021-12-17 NOTE — PROGRESS NOTES
End of Shift Note    Bedside shift change report given to Camilla (oncoming nurse) by Azalea Garrett (offgoing nurse). Report included the following information SBAR, Kardex, Procedure Summary, Intake/Output, MAR and Recent Results    Shift worked:  7a-7p     Shift summary and any significant changes:    Pt medicated for pain to finger and back throughout shift. Complained of nausea once. MRI completed. Pt started on Regular diet which he is tolerating. NPO at midnight for procedure tomorrow. Pt with flat affect but cooperative with care. Guards x2 present entire shift, shackles to ankles -no skin irritation noted. Concerns for physician to address:       Zone phone for oncoming shift:   [phone]       Activity:  Activity Level: Up with Assistance  Number times ambulated in hallways past shift: 0  Number of times OOB to chair past shift: 0    Cardiac:   Cardiac Monitoring: No           Access:   Current line(s): PIV     Genitourinary:   Urinary status: voiding    Respiratory:   O2 Device: None (Room air)  Chronic home O2 use?: NO  Incentive spirometer at bedside: N/A     GI:     Current diet:  DIET NPO  ADULT DIET Regular  DIET ONE TIME MESSAGE  ADULT ORAL NUTRITION SUPPLEMENT Breakfast, Lunch, Dinner; Standard High Calorie/High Protein  Passing flatus: YES  Tolerating current diet: YES       Pain Management:   Patient states pain is manageable on current regimen: YES    Skin:  Bill Score: 21  Interventions: increase time out of bed    Patient Safety:  Fall Score:  Total Score: 2  Interventions: gripper socks and pt to call before getting OOB  High Fall Risk: Yes    Length of Stay:  Expected LOS: 4d 7h  Actual LOS: [address]

## 2021-12-18 LAB
ANION GAP SERPL CALC-SCNC: 4 MMOL/L (ref 5–15)
BASOPHILS # BLD: 0.1 K/UL (ref 0–0.1)
BASOPHILS NFR BLD: 1 % (ref 0–1)
BUN SERPL-MCNC: 18 MG/DL (ref 6–20)
BUN/CREAT SERPL: 23 (ref 12–20)
CALCIUM SERPL-MCNC: 8.9 MG/DL (ref 8.5–10.1)
CHLORIDE SERPL-SCNC: 107 MMOL/L (ref 97–108)
CO2 SERPL-SCNC: 27 MMOL/L (ref 21–32)
COMMENT, HOLDF: NORMAL
CREAT SERPL-MCNC: 0.78 MG/DL (ref 0.7–1.3)
DIFFERENTIAL METHOD BLD: ABNORMAL
EOSINOPHIL # BLD: 0.4 K/UL (ref 0–0.4)
EOSINOPHIL NFR BLD: 3 % (ref 0–7)
ERYTHROCYTE [DISTWIDTH] IN BLOOD BY AUTOMATED COUNT: 14.2 % (ref 11.5–14.5)
GLUCOSE SERPL-MCNC: 94 MG/DL (ref 65–100)
HCT VFR BLD AUTO: 41.4 % (ref 36.6–50.3)
HGB BLD-MCNC: 13.7 G/DL (ref 12.1–17)
IMM GRANULOCYTES # BLD AUTO: 0.1 K/UL (ref 0–0.04)
IMM GRANULOCYTES NFR BLD AUTO: 1 % (ref 0–0.5)
LYMPHOCYTES # BLD: 3.8 K/UL (ref 0.8–3.5)
LYMPHOCYTES NFR BLD: 30 % (ref 12–49)
MCH RBC QN AUTO: 27.8 PG (ref 26–34)
MCHC RBC AUTO-ENTMCNC: 33.1 G/DL (ref 30–36.5)
MCV RBC AUTO: 84 FL (ref 80–99)
MONOCYTES # BLD: 0.6 K/UL (ref 0–1)
MONOCYTES NFR BLD: 4 % (ref 5–13)
NEUTS SEG # BLD: 7.9 K/UL (ref 1.8–8)
NEUTS SEG NFR BLD: 61 % (ref 32–75)
NRBC # BLD: 0 K/UL (ref 0–0.01)
NRBC BLD-RTO: 0 PER 100 WBC
PLATELET # BLD AUTO: 467 K/UL (ref 150–400)
PMV BLD AUTO: 8.5 FL (ref 8.9–12.9)
POTASSIUM SERPL-SCNC: 4.2 MMOL/L (ref 3.5–5.1)
RBC # BLD AUTO: 4.93 M/UL (ref 4.1–5.7)
SAMPLES BEING HELD,HOLD: NORMAL
SODIUM SERPL-SCNC: 138 MMOL/L (ref 136–145)
WBC # BLD AUTO: 12.7 K/UL (ref 4.1–11.1)

## 2021-12-18 PROCEDURE — 2709999900 HC NON-CHARGEABLE SUPPLY

## 2021-12-18 PROCEDURE — 74011250636 HC RX REV CODE- 250/636: Performed by: GENERAL ACUTE CARE HOSPITAL

## 2021-12-18 PROCEDURE — 85025 COMPLETE CBC W/AUTO DIFF WBC: CPT

## 2021-12-18 PROCEDURE — 74011250637 HC RX REV CODE- 250/637: Performed by: ORTHOPAEDIC SURGERY

## 2021-12-18 PROCEDURE — 74011250636 HC RX REV CODE- 250/636: Performed by: ORTHOPAEDIC SURGERY

## 2021-12-18 PROCEDURE — 94760 N-INVAS EAR/PLS OXIMETRY 1: CPT

## 2021-12-18 PROCEDURE — 74011000250 HC RX REV CODE- 250: Performed by: NURSE PRACTITIONER

## 2021-12-18 PROCEDURE — 74011250637 HC RX REV CODE- 250/637: Performed by: GENERAL ACUTE CARE HOSPITAL

## 2021-12-18 PROCEDURE — 36415 COLL VENOUS BLD VENIPUNCTURE: CPT

## 2021-12-18 PROCEDURE — 80048 BASIC METABOLIC PNL TOTAL CA: CPT

## 2021-12-18 PROCEDURE — 74011000258 HC RX REV CODE- 258: Performed by: ORTHOPAEDIC SURGERY

## 2021-12-18 PROCEDURE — 74011250636 HC RX REV CODE- 250/636: Performed by: NURSE PRACTITIONER

## 2021-12-18 PROCEDURE — 65660000000 HC RM CCU STEPDOWN

## 2021-12-18 RX ORDER — ENOXAPARIN SODIUM 100 MG/ML
30 INJECTION SUBCUTANEOUS EVERY 24 HOURS
Status: DISCONTINUED | OUTPATIENT
Start: 2021-12-18 | End: 2021-12-18

## 2021-12-18 RX ORDER — ACETAMINOPHEN 325 MG/1
650 TABLET ORAL EVERY 6 HOURS
Status: DISCONTINUED | OUTPATIENT
Start: 2021-12-18 | End: 2021-12-22 | Stop reason: HOSPADM

## 2021-12-18 RX ORDER — HEPARIN SODIUM 5000 [USP'U]/ML
5000 INJECTION, SOLUTION INTRAVENOUS; SUBCUTANEOUS EVERY 12 HOURS
Status: DISCONTINUED | OUTPATIENT
Start: 2021-12-19 | End: 2021-12-22 | Stop reason: HOSPADM

## 2021-12-18 RX ADMIN — CEFEPIME HYDROCHLORIDE 2 G: 2 INJECTION, POWDER, FOR SOLUTION INTRAVENOUS at 09:36

## 2021-12-18 RX ADMIN — FAMOTIDINE 20 MG: 20 TABLET ORAL at 09:41

## 2021-12-18 RX ADMIN — MORPHINE SULFATE 1 MG: 2 INJECTION, SOLUTION INTRAMUSCULAR; INTRAVENOUS at 09:37

## 2021-12-18 RX ADMIN — ONDANSETRON 4 MG: 4 TABLET, ORALLY DISINTEGRATING ORAL at 17:00

## 2021-12-18 RX ADMIN — Medication 10 ML: at 06:10

## 2021-12-18 RX ADMIN — MORPHINE SULFATE 1 MG: 2 INJECTION, SOLUTION INTRAMUSCULAR; INTRAVENOUS at 19:57

## 2021-12-18 RX ADMIN — KETOROLAC TROMETHAMINE 30 MG: 30 INJECTION, SOLUTION INTRAMUSCULAR; INTRAVENOUS at 03:29

## 2021-12-18 RX ADMIN — CEFEPIME HYDROCHLORIDE 2 G: 2 INJECTION, POWDER, FOR SOLUTION INTRAVENOUS at 17:10

## 2021-12-18 RX ADMIN — LORAZEPAM 2 MG: 2 INJECTION INTRAMUSCULAR; INTRAVENOUS at 03:29

## 2021-12-18 RX ADMIN — Medication 10 ML: at 21:12

## 2021-12-18 RX ADMIN — LORAZEPAM 2 MG: 2 INJECTION INTRAMUSCULAR; INTRAVENOUS at 06:28

## 2021-12-18 RX ADMIN — MORPHINE SULFATE 1 MG: 2 INJECTION, SOLUTION INTRAMUSCULAR; INTRAVENOUS at 01:02

## 2021-12-18 RX ADMIN — KETOROLAC TROMETHAMINE 30 MG: 30 INJECTION, SOLUTION INTRAMUSCULAR; INTRAVENOUS at 23:24

## 2021-12-18 RX ADMIN — KETOROLAC TROMETHAMINE 30 MG: 30 INJECTION, SOLUTION INTRAMUSCULAR; INTRAVENOUS at 10:46

## 2021-12-18 RX ADMIN — Medication 10 ML: at 09:40

## 2021-12-18 RX ADMIN — OXYCODONE 5 MG: 5 TABLET ORAL at 06:28

## 2021-12-18 RX ADMIN — OXYCODONE 5 MG: 5 TABLET ORAL at 17:01

## 2021-12-18 RX ADMIN — PROCHLORPERAZINE EDISYLATE 10 MG: 5 INJECTION INTRAMUSCULAR; INTRAVENOUS at 21:12

## 2021-12-18 RX ADMIN — FOLIC ACID 1 MG: 1 TABLET ORAL at 09:41

## 2021-12-18 RX ADMIN — GABAPENTIN 100 MG: 100 CAPSULE ORAL at 09:41

## 2021-12-18 RX ADMIN — CEFEPIME HYDROCHLORIDE 2 G: 2 INJECTION, POWDER, FOR SOLUTION INTRAVENOUS at 01:02

## 2021-12-18 RX ADMIN — VANCOMYCIN HYDROCHLORIDE 1250 MG: 10 INJECTION, POWDER, LYOPHILIZED, FOR SOLUTION INTRAVENOUS at 06:28

## 2021-12-18 RX ADMIN — Medication 10 ML: at 10:48

## 2021-12-18 RX ADMIN — Medication 10 ML: at 06:11

## 2021-12-18 RX ADMIN — VANCOMYCIN HYDROCHLORIDE 1250 MG: 10 INJECTION, POWDER, LYOPHILIZED, FOR SOLUTION INTRAVENOUS at 18:08

## 2021-12-18 RX ADMIN — ACETAMINOPHEN 650 MG: 325 TABLET ORAL at 17:11

## 2021-12-18 RX ADMIN — ALPRAZOLAM 0.5 MG: 0.5 TABLET ORAL at 10:45

## 2021-12-18 RX ADMIN — DOCUSATE SODIUM 50MG AND SENNOSIDES 8.6MG 2 TABLET: 8.6; 5 TABLET, FILM COATED ORAL at 09:41

## 2021-12-18 RX ADMIN — ONDANSETRON 4 MG: 2 INJECTION INTRAMUSCULAR; INTRAVENOUS at 23:24

## 2021-12-18 RX ADMIN — ACETAMINOPHEN 650 MG: 325 TABLET ORAL at 10:44

## 2021-12-18 RX ADMIN — ALPRAZOLAM 0.5 MG: 0.5 TABLET ORAL at 19:57

## 2021-12-18 RX ADMIN — Medication 100 MG: at 09:41

## 2021-12-18 RX ADMIN — KETOROLAC TROMETHAMINE 30 MG: 30 INJECTION, SOLUTION INTRAMUSCULAR; INTRAVENOUS at 17:09

## 2021-12-18 RX ADMIN — GABAPENTIN 100 MG: 100 CAPSULE ORAL at 17:11

## 2021-12-18 RX ADMIN — Medication 10 ML: at 10:49

## 2021-12-18 RX ADMIN — FAMOTIDINE 20 MG: 20 TABLET ORAL at 17:11

## 2021-12-18 NOTE — PROGRESS NOTES
Physician Progress Note      PATIENTClotejose Leung  CSN #:                  [id number]  :                       1990  ADMIT DATE:       2021 1:53 PM  DISCH DATE:  RESPONDING  PROVIDER #:        Sveta Perla MD          QUERY TEXT:    Patient admitted with Right fifth finger pain. Per RD consult note dated , Severe muscle mass loss, Temples (temporalis),Clavicles (pectoralis &deltoids) and Severe body fat loss, Fat overlying ribs. If possible, please document in progress notes and discharge summary if you are evaluating and /or treating any of the following: The medical record reflects the following:  Risk Factors: Hx: hepatitis C, drug abuse, psych hx;  blood blister about 4 days ago and it burst open  Clinical Indicators: Albumin 3.4. ....... RD noted patient meets the following ASPEN criteria:  Malnutrition Status:  Severe malnutrition  Context:  Social/environmental circumstances  Findings of the 6 clinical characteristics of malnutrition:  Energy Intake:  1 - Less than 75% est energy requirements for 3 months or longer  Weight Loss:  Mild weight loass (specify amount and time period)  Body Fat Loss:  7 - Severe body fat loss, Fat overlying ribs  Muscle Mass Loss:  7 - Severe muscle mass loss, Temples (temporalis),Clavicles (pectoralis &deltoids)  Fluid Accumulation:  Unable to assess,   Strength:  Not performed  Treatment: RD consult; Nutrition Recommendations/Plan: Continue Regular diet as tolerated; RD to add Ensure Enlive TID    Thank you,    Art Keller  [facility]      ASPEN Criteria:  https://aspenjournals. onlinelibrary. esposito. com/doi/full/10.1177/9388767579147338  Options provided:  -- Protein calorie malnutrition severe  -- Underweight with BMI 17.22  -- Other - I will add my own diagnosis  -- Disagree - Not applicable / Not valid  -- Disagree - Clinically unable to determine / Unknown  -- Refer to Clinical Documentation Reviewer    PROVIDER RESPONSE TEXT:    This patient is underweight with a BMI of 17.22.     Query created by: Yung Aggarwal on 12/17/2021 2:41 PM      Electronically signed by:  Katie Mckeon MD 12/18/2021 9:17 AM

## 2021-12-18 NOTE — PROGRESS NOTES
Hospitalist Progress Note    NAME: Jovanny Lino   :  1990   MRN:  [id number]       Assessment / Plan:    Right fifth finger open fracture of the distal phalanx with osteomyelitis and necrotic tissue at the distal finger. -X-ray confirms osteomyelitis. MRI:  1. On the prior radiographs, there was a mildly displaced fracture through the  distal phalangeal tuft. The small displaced osseous fragment is not well-seen on  MRI. Abnormal signal in the fifth distal phalanx could be related to trauma  and/or infection. 2. Mild edema in the fifth middle phalanx may be reactive or related to early  osteomyelitis. 3. Diffuse subcutaneous edema in the fifth finger. No evidence of a focal drainable fluid collection. Pain control  S/p Right distal fingertip amputation to the level of the distal portion of the middle phalanx with skin closure, Excisional debridement of skin, subcutaneous tissues, muscle, and bone along with tendon. F/u Cx   Start empiric vancomycin, cefepime. ID consulted      Abdominal pain, nausea and vomiting  -CT abdomen shows liver hemangiomas but no other acute process  -CMP is normal.  Lipase level is normal.  Troponin is normal.  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     Hepatitis C  CT and reported Heterogeneous liver attenuation, nonspecific, but can be seen with  hepatitis. Additionally, several wedge-shaped peripheral regions of enhancement  in the liver are noted, which favor areas of shunting versus hemangiomas. CT liver protocol did not show any liver lesions  Check AFP  HIV neg    Patient is currently incarcerated and cannot call his family.   He will need to return to [address] [facility] [address]: From [facility]  and is currently incarcerated  less than 18.5 Underweight / Body mass index is 17.22 kg/m². Code status: Full  Prophylaxis: Lovenox  Recommended Disposition: Incarcerated   Anticipated Discharge Date:  >48 hours        Subjective:     Discussed with RN events overnight. C/o finger pain  C/o back pain, afebrile, no lower extremity numbness/weakness    Review of Systems:  Symptom Y/N Comments  Symptom Y/N Comments   Fever/Chills    Chest Pain     Poor Appetite    Edema     Cough    Abdominal Pain     Sputum    Joint Pain     SOB/ANAYA    Pruritis/Rash     Nausea/vomit    Tolerating PT/OT     Diarrhea    Tolerating Diet     Constipation    Other       PO intake: No data found. Wt Readings from Last 10 Encounters:   12/14/21 54.4 kg (120 lb)       Objective:     VITALS:   Last 24hrs VS reviewed since prior progress note.  Most recent are:  Patient Vitals for the past 24 hrs:   Temp Pulse Resp BP SpO2   12/17/21 1942 98.9 °F (37.2 °C) 74 18 130/86 97 %   12/17/21 1529 98.4 °F (36.9 °C) 66 18 110/77 98 %   12/17/21 1330 98.2 °F (36.8 °C) (!) 57 17 (!) 120/95 97 %   12/17/21 1315 97.4 °F (36.3 °C) 62 18 132/87 95 %   12/17/21 1300  74 17 (!) 136/109 100 %   12/17/21 1245  (!) 51 17 131/83 100 %   12/17/21 1240  (!) 58 18 122/76 100 %   12/17/21 1235  61 17 116/72 100 %   12/17/21 1231 97.5 °F (36.4 °C) 76 10 (!) 104/54 99 %   12/17/21 1230 97.9 °F (36.6 °C) 65 18 105/65 100 %   12/17/21 1136 98.4 °F (36.9 °C) 87 21 122/82    12/17/21 1123  84 11 (!) 143/79    12/17/21 1115 98.4 °F (36.9 °C) 77 18 (!) 136/106 100 %   12/17/21 0754 98.7 °F (37.1 °C) 60 17 (!) 142/80 99 %   12/17/21 0351 98.7 °F (37.1 °C) (!) 57 16 (!) 149/91 97 %       Intake/Output Summary (Last 24 hours) at 12/18/2021 0345  Last data filed at 12/17/2021 1810  Gross per 24 hour   Intake 1980 ml   Output 1680 ml   Net 300 ml        I had a face to face encounter, and independently examined this patient on 12/18/2021, as outlined below:    PHYSICAL EXAM:  General:    No distress     HEENT: Atraumatic, anicteric sclerae, pink conjunctivae, MMM  Neck:  Supple, symmetrical  Lungs:   CTA. No Wheezing/Rhonchi. No rales. No tenderness. No Accessory muscle use. Heart:   Regular rhythm. No murmur. No JVD   GI/:   Soft. No tenderness. ND. BS normal  Extremities: No edema. No cyanosis. No clubbing. R 5th finger covered by dressing   Skin:     Not pale. Not Jaundiced. No rashes   Psych:  Good insight. Not depressed. Not anxious or agitated. Neurologic: Alert and oriented X 4. EOMs intact. No facial asymmetry. No slurred speech. Symmetrical strength, Sensation grossly intact. Labs     I reviewed today's most current labs and imaging studies. Pertinent labs include:  Recent Labs     12/18/21 0115 12/17/21 0435 12/16/21  0406   WBC 12.7* 14.0* 14.1*   HGB 13.7 15.3 14.2   HCT 41.4 46.0 42.7   * 577* 599*     Recent Labs     12/18/21 0115 12/17/21 0435 12/16/21  0406    132* 132*   K 4.2 4.0 3.6    103 100   CO2 27 23 24   GLU 94 112* 101*   BUN 18 14 14   CREA 0.78 0.82 0.85   CA 8.9 9.3 9.0     XR 5TH FINGER RT MIN 2 V    Result Date: 12/14/2021  Findings concerning for acute osteomyelitis involving the tuft of the fifth distal phalanx. CT ABD PELV W CONT    Result Date: 12/14/2021  1. No definite acute abnormality. 2.  Heterogeneous liver attenuation, nonspecific, but can be seen with hepatitis. Additionally, several wedge-shaped peripheral regions of enhancement in the liver are noted, which favor areas of shunting versus hemangiomas. Nonemergent liver mass protocol CT or MRI can be obtained as an outpatient for definitive characterization    CT ABD PELV W WO CONT    Result Date: 12/15/2021  1. Findings within the liver are compatible with transient perfusion abnormality/shunting. A discrete mass lesion is not identified    XR CHEST PORT    Result Date: 12/14/2021  No acute findings.      MRI FINGER/HAND JT RT W WO CONT    Result Date: 12/16/2021  1. On the prior radiographs, there was a mildly displaced fracture through the distal phalangeal tuft. The small displaced osseous fragment is not well-seen on MRI. Abnormal signal in the fifth distal phalanx could be related to trauma and/or infection. 2. Mild edema in the fifth middle phalanx may be reactive or related to early osteomyelitis. 3. Diffuse subcutaneous edema in the fifth finger. No evidence of a focal drainable fluid collection. No results found.        Current Medications:     Current Facility-Administered Medications:     sodium chloride (NS) flush 5-40 mL, 5-40 mL, IntraVENous, Q8H, Zelaya, Christian C, DO, 10 mL at 12/17/21 2152    sodium chloride (NS) flush 5-40 mL, 5-40 mL, IntraVENous, PRN, Damon Hines Christian C, DO, 10 mL at 12/17/21 1447    naloxone (NARCAN) injection 0.4 mg, 0.4 mg, IntraVENous, PRN, Lyle Mustafain C, DO    ketorolac (TORADOL) injection 30 mg, 30 mg, IntraVENous, Q6H PRN, Alton Graf MD, 30 mg at 12/18/21 0329    ALPRAZolam (XANAX) tablet 0.5 mg, 0.5 mg, Oral, BID PRN, Christina Graf MD, 0.5 mg at 12/17/21 2118    vancomycin (VANCOCIN) 1250 mg in  ml infusion, 1,250 mg, IntraVENous, Q12H, Zelaya Christian C, DO, Last Rate: 125 mL/hr at 12/17/21 1809, 1,250 mg at 12/17/21 1809    lidocaine 4 % patch 1 Patch, 1 Patch, TransDERmal, Q24H, Zelaya Christian C, DO, 1 Patch at 12/16/21 1435    gabapentin (NEURONTIN) capsule 100 mg, 100 mg, Oral, BID, Zelaya, Christian C, DO, 100 mg at 12/17/21 1719    oxyCODONE IR (ROXICODONE) tablet 5 mg, 5 mg, Oral, Q6H PRN, Lyle Mustafain C, DO, 5 mg at 12/17/21 2234    morphine injection 1 mg, 1 mg, IntraVENous, Q8H PRN, Lyle Mustafain C, DO, 1 mg at 12/18/21 0102    promethazine (PHENERGAN) tablet 25 mg, 25 mg, Oral, Q6H PRN, Lyle Mustafain C, DO    famotidine (PEPCID) tablet 20 mg, 20 mg, Oral, BID, Christian Zelaya, DO, 20 mg at 12/17/21 1720    senna-docusate (PERICOLACE) 8.6-50 mg per tablet 2 Tablet, 2 Tablet, Oral, DAILY, Arva Serge, DO, 2 Tablet at 12/16/21 0948    sodium chloride (NS) flush 5-40 mL, 5-40 mL, IntraVENous, Q8H, Zelaya, Christian C, DO, 10 mL at 12/17/21 2152    sodium chloride (NS) flush 5-40 mL, 5-40 mL, IntraVENous, PRN, Bren Pinzon, Christian C, DO, 10 mL at 12/17/21 1848    acetaminophen (TYLENOL) tablet 650 mg, 650 mg, Oral, Q6H PRN, 650 mg at 12/17/21 1852 **OR** acetaminophen (TYLENOL) suppository 650 mg, 650 mg, Rectal, Q6H PRN, Bren Pinzon, Christian C, DO    polyethylene glycol (MIRALAX) packet 17 g, 17 g, Oral, DAILY PRN, Bren Pinzon, Christian C, DO    ondansetron (ZOFRAN ODT) tablet 4 mg, 4 mg, Oral, Q8H PRN, 4 mg at 12/16/21 1319 **OR** ondansetron (ZOFRAN) injection 4 mg, 4 mg, IntraVENous, Q6H PRN, Bren Pinzon Christian C, DO, 4 mg at 12/17/21 1258    cefepime (MAXIPIME) 2 g in 0.9% sodium chloride (MBP/ADV) 100 mL MBP, 2 g, IntraVENous, Q8H, Zelaya, Christian C, DO, Last Rate: 200 mL/hr at 12/18/21 0102, 2 g at 12/18/21 0102    LORazepam (ATIVAN) injection 2 mg, 2 mg, IntraVENous, Q1H PRN, Bren Pinzon Christian C, DO, 2 mg at 12/18/21 0329    LORazepam (ATIVAN) injection 4 mg, 4 mg, IntraVENous, Q1H PRN, Bren Pinzon, Christian C, DO    folic acid (FOLVITE) tablet 1 mg, 1 mg, Oral, DAILY, Zelaya, Christian C, DO, 1 mg at 12/16/21 9356    thiamine mononitrate (B-1) tablet 100 mg, 100 mg, Oral, DAILY, Zelaya, Christian C, DO, 100 mg at 12/16/21 0948    [Held by provider] heparin (porcine) injection 5,000 Units, 5,000 Units, SubCUTAneous, Q12H, Franki Tobin MD     Procedures: see electronic medical records for all procedures/Xrays and details which were not copied into this note but were reviewed prior to creation of Plan.     Reviewed most current lab test results and cultures  YES  Reviewed most current radiology test results   YES  Review and summation of old records today    NO  Reviewed patient's current orders and MAR    YES  PMH/SH reviewed - no change compared to H&P  ________________________________________________________________________  Care Plan discussed with:    Comments   Patient x    Family      RN x    Care Manager     Consultant                        Multidiciplinary team rounds were held today with , nursing, pharmacist and clinical coordinator. Patient's plan of care was discussed; medications were reviewed and discharge planning was addressed.      ________________________________________________________________________  Total NON critical care TIME:   30  Minutes    Total CRITICAL CARE TIME Spent:   Minutes non procedure based      Comments   >50% of visit spent in counseling and coordination of care x     This includes time during multidisciplinary rounds if indicated above   ________________________________________________________________________  Maikol Mejia MD

## 2021-12-18 NOTE — PROGRESS NOTES
Hospitalist Progress Note    NAME: Roseanna Rendon   :  1990   MRN:  [id number]       Assessment / Plan:    Right fifth finger open fracture of the distal phalanx with osteomyelitis and necrotic tissue at the distal finger. -X-ray confirms osteomyelitis. MRI:  1. On the prior radiographs, there was a mildly displaced fracture through the  distal phalangeal tuft. The small displaced osseous fragment is not well-seen on  MRI. Abnormal signal in the fifth distal phalanx could be related to trauma  and/or infection. 2. Mild edema in the fifth middle phalanx may be reactive or related to early  osteomyelitis. 3. Diffuse subcutaneous edema in the fifth finger. No evidence of a focal drainable fluid collection. Pain control  S/p Right distal fingertip amputation to the level of the distal portion of the middle phalanx with skin closure, Excisional debridement of skin, subcutaneous tissues, muscle, and bone along with tendon. F/u intra operative Cx   Start empiric vancomycin, cefepime. ID consulted      Abdominal pain, nausea and vomiting  -CT abdomen shows liver hemangiomas but no other acute process  -CMP is normal.  Lipase level is normal.  Troponin is normal.     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 Additionally, several wedge-shaped peripheral regions of enhancement  in the liver are noted, which favor areas of shunting versus hemangiomas. CT liver protocol did not show any liver lesions  Check AFP  HIV neg    Patient is currently incarcerated and cannot call his family.   He will need to return to [address] [facility] [address]: From [employer] [facility], [facility]  and is currently incarcerated  less than 18.5 Underweight / Body mass index is 17.22 kg/m². Code status: Full  Prophylaxis: Lovenox  Recommended Disposition: Incarcerated   Anticipated Discharge Date:  >48 hours        Subjective:     Discussed with RN events overnight. C/o finger pain, with pain seeking behavior   Removed dressing on his own  C/o back pain, afebrile, no lower extremity numbness/weakness    Review of Systems:  Symptom Y/N Comments  Symptom Y/N Comments   Fever/Chills    Chest Pain     Poor Appetite    Edema     Cough    Abdominal Pain     Sputum    Joint Pain     SOB/ANAYA    Pruritis/Rash     Nausea/vomit    Tolerating PT/OT     Diarrhea    Tolerating Diet     Constipation    Other       PO intake: No data found. Wt Readings from Last 10 Encounters:   12/14/21 54.4 kg (120 lb)       Objective:     VITALS:   Last 24hrs VS reviewed since prior progress note.  Most recent are:  Patient Vitals for the past 24 hrs:   Temp Pulse Resp BP SpO2   12/18/21 0808 98.4 °F (36.9 °C) 89 18 116/82 99 %   12/18/21 0403 97.7 °F (36.5 °C) 88 18 127/70 98 %   12/17/21 1942 98.9 °F (37.2 °C) 74 18 130/86 97 %   12/17/21 1529 98.4 °F (36.9 °C) 66 18 110/77 98 %   12/17/21 1330 98.2 °F (36.8 °C) (!) 57 17 (!) 120/95 97 %   12/17/21 1315 97.4 °F (36.3 °C) 62 18 132/87 95 %   12/17/21 1300  74 17 (!) 136/109 100 %   12/17/21 1245  (!) 51 17 131/83 100 %   12/17/21 1240  (!) 58 18 122/76 100 %   12/17/21 1235  61 17 116/72 100 %   12/17/21 1231 97.5 °F (36.4 °C) 76 10 (!) 104/54 99 %   12/17/21 1230 97.9 °F (36.6 °C) 65 18 105/65 100 %   12/17/21 1136 98.4 °F (36.9 °C) 87 21 122/82    12/17/21 1123  84 11 (!) 143/79    12/17/21 1115 98.4 °F (36.9 °C) 77 18 (!) 136/106 100 %       Intake/Output Summary (Last 24 hours) at 12/18/2021 0920  Last data filed at 12/18/2021 9086  Gross per 24 hour   Intake 2072 ml   Output 2380 ml   Net -308 ml        I had a face to face encounter, and independently examined this patient on 12/18/2021, as outlined below:    PHYSICAL EXAM:  General:    No distress     HEENT: Atraumatic, anicteric sclerae, pink conjunctivae, MMM  Neck:  Supple, symmetrical  Lungs:   CTA. No Wheezing/Rhonchi. No rales. No tenderness. No Accessory muscle use. Heart:   Regular rhythm. No murmur. No JVD   GI/:   Soft. No tenderness. ND. BS normal  Extremities: No edema. No cyanosis. No clubbing. R 5th finger covered by dressing   Skin:     Not pale. Not Jaundiced. No rashes   Psych:  Good insight. Not depressed. Not anxious or agitated. Neurologic: Alert and oriented X 4. EOMs intact. No facial asymmetry. No slurred speech. Symmetrical strength, Sensation grossly intact. Labs     I reviewed today's most current labs and imaging studies. Pertinent labs include:  Recent Labs     12/18/21 0115 12/17/21 0435 12/16/21  0406   WBC 12.7* 14.0* 14.1*   HGB 13.7 15.3 14.2   HCT 41.4 46.0 42.7   * 577* 599*     Recent Labs     12/18/21  0115 12/17/21 0435 12/16/21  0406    132* 132*   K 4.2 4.0 3.6    103 100   CO2 27 23 24   GLU 94 112* 101*   BUN 18 14 14   CREA 0.78 0.82 0.85   CA 8.9 9.3 9.0     XR 5TH FINGER RT MIN 2 V    Result Date: 12/14/2021  Findings concerning for acute osteomyelitis involving the tuft of the fifth distal phalanx. CT ABD PELV W CONT    Result Date: 12/14/2021  1. No definite acute abnormality. 2.  Heterogeneous liver attenuation, nonspecific, but can be seen with hepatitis. Additionally, several wedge-shaped peripheral regions of enhancement in the liver are noted, which favor areas of shunting versus hemangiomas. Nonemergent liver mass protocol CT or MRI can be obtained as an outpatient for definitive characterization    CT ABD PELV W WO CONT    Result Date: 12/15/2021  1. Findings within the liver are compatible with transient perfusion abnormality/shunting. A discrete mass lesion is not identified    XR CHEST PORT    Result Date: 12/14/2021  No acute findings.      MRI FINGER/HAND JT RT W WO CONT    Result Date: 12/16/2021  1. On the prior radiographs, there was a mildly displaced fracture through the distal phalangeal tuft. The small displaced osseous fragment is not well-seen on MRI. Abnormal signal in the fifth distal phalanx could be related to trauma and/or infection. 2. Mild edema in the fifth middle phalanx may be reactive or related to early osteomyelitis. 3. Diffuse subcutaneous edema in the fifth finger. No evidence of a focal drainable fluid collection. No results found.        Current Medications:     Current Facility-Administered Medications:     [START ON 12/19/2021] heparin (porcine) injection 5,000 Units, 5,000 Units, SubCUTAneous, Q12H, Estrellita Graf MD    acetaminophen (TYLENOL) tablet 650 mg, 650 mg, Oral, Q6H, Estrelltia Graf MD    sodium chloride (NS) flush 5-40 mL, 5-40 mL, IntraVENous, Q8H, ZelayaLylein C, DO, 10 mL at 12/18/21 0610    sodium chloride (NS) flush 5-40 mL, 5-40 mL, IntraVENous, PRN, Rosezezhannaa Embs, Christian C, DO, 10 mL at 12/17/21 1447    naloxone (NARCAN) injection 0.4 mg, 0.4 mg, IntraVENous, PRN, Tiffaniezeyuriy Embs, Christian C, DO    ketorolac (TORADOL) injection 30 mg, 30 mg, IntraVENous, Q6H PRN, Estrellita Graf MD, 30 mg at 12/18/21 0329    ALPRAZolam (XANAX) tablet 0.5 mg, 0.5 mg, Oral, BID PRN, Christina Graf MD, 0.5 mg at 12/17/21 2118    vancomycin (VANCOCIN) 1250 mg in  ml infusion, 1,250 mg, IntraVENous, Q12H, Lyle Zelayain C, DO, Last Rate: 125 mL/hr at 12/18/21 0628, 1,250 mg at 12/18/21 5604    lidocaine 4 % patch 1 Patch, 1 Patch, TransDERmal, Q24H, Lyle Zelayain C, DO, 1 Patch at 12/16/21 1435    gabapentin (NEURONTIN) capsule 100 mg, 100 mg, Oral, BID, Zelaya, Christian C, DO, 100 mg at 12/17/21 1719    oxyCODONE IR (ROXICODONE) tablet 5 mg, 5 mg, Oral, Q6H PRN, Rosezetta Embs, Christian C, DO, 5 mg at 12/18/21 4671    morphine injection 1 mg, 1 mg, IntraVENous, Q8H PRN, Rosezetta Embs, Christian C, DO, 1 mg at 12/18/21 0102    promethazine (PHENERGAN) tablet 25 mg, 25 mg, Oral, Q6H PRN, Lyle Rodriguezin C, DO    famotidine (PEPCID) tablet 20 mg, 20 mg, Oral, BID, Zelaya, Christian C, DO, 20 mg at 12/17/21 1720    senna-docusate (PERICOLACE) 8.6-50 mg per tablet 2 Tablet, 2 Tablet, Oral, DAILY, Arelia Costain, DO, 2 Tablet at 12/16/21 0948    sodium chloride (NS) flush 5-40 mL, 5-40 mL, IntraVENous, Q8H, Zelaya, Christian C, DO, 10 mL at 12/18/21 0611    sodium chloride (NS) flush 5-40 mL, 5-40 mL, IntraVENous, PRN, Lyle Rodriguezin C, DO, 10 mL at 12/17/21 1848    acetaminophen (TYLENOL) tablet 650 mg, 650 mg, Oral, Q6H PRN, 650 mg at 12/17/21 1852 **OR** acetaminophen (TYLENOL) suppository 650 mg, 650 mg, Rectal, Q6H PRN, Lyle Rodriguezin C, DO    polyethylene glycol (MIRALAX) packet 17 g, 17 g, Oral, DAILY PRN, Lyle Rodriguezin C, DO    ondansetron (ZOFRAN ODT) tablet 4 mg, 4 mg, Oral, Q8H PRN, 4 mg at 12/16/21 1319 **OR** ondansetron (ZOFRAN) injection 4 mg, 4 mg, IntraVENous, Q6H PRN, Lyle Rodriguezin C, DO, 4 mg at 12/17/21 1258    cefepime (MAXIPIME) 2 g in 0.9% sodium chloride (MBP/ADV) 100 mL MBP, 2 g, IntraVENous, Q8H, Zelaya, Christian C, DO, Last Rate: 200 mL/hr at 12/18/21 0102, 2 g at 99/44/65 5993    folic acid (FOLVITE) tablet 1 mg, 1 mg, Oral, DAILY, Zelaya, Christian C, DO, 1 mg at 12/16/21 3825    thiamine mononitrate (B-1) tablet 100 mg, 100 mg, Oral, DAILY, Zelaya, Christian C, DO, 100 mg at 12/16/21 0948    [Held by provider] heparin (porcine) injection 5,000 Units, 5,000 Units, SubCUTAneous, Q12H, Eric Tobin MD     Procedures: see electronic medical records for all procedures/Xrays and details which were not copied into this note but were reviewed prior to creation of Plan.     Reviewed most current lab test results and cultures  YES  Reviewed most current radiology test results   YES  Review and summation of old records today    NO  Reviewed patient's current orders and MAR    YES  PMH/SH reviewed - no change compared to H&P  ________________________________________________________________________  Care Plan discussed with:    Comments   Patient x    Family      RN x    Care Manager     Consultant                        Multidiciplinary team rounds were held today with , nursing, pharmacist and clinical coordinator. Patient's plan of care was discussed; medications were reviewed and discharge planning was addressed.      ________________________________________________________________________  Total NON critical care TIME:   25  Minutes    Total CRITICAL CARE TIME Spent:   Minutes non procedure based      Comments   >50% of visit spent in counseling and coordination of care x     This includes time during multidisciplinary rounds if indicated above   ________________________________________________________________________  Jitendra Mejia MD

## 2021-12-18 NOTE — OP NOTES
[address]  OPERATIVE REPORT    Name:  Alena Vergara  MR#:  [id number]  :  1990  ACCOUNT #:  [de-identified]  DATE OF SERVICE:  2021    PREOPERATIVE DIAGNOSES:  Right fifth finger open fracture of the distal phalanx with osteomyelitis and necrotic tissue at the distal finger. POSTOPERATIVE DIAGNOSES:  Right fifth finger open fracture of the distal phalanx with osteomyelitis and necrotic tissue at the distal finger. PROCEDURE PERFORMED:  1. Right distal fingertip amputation to the level of the distal portion of the middle phalanx with skin closure. 2.  Excisional debridement of skin, subcutaneous tissues, muscle, and bone along with tendon. SURGEON:  Cammy Linn DO    ASSISTANT:  None. ANESTHESIA:  General.    COMPLICATIONS:  None. SPECIMENS REMOVED:  Cultures, aerobic and anaerobic sent, and fingertip sent for analysis of osteomyelitis. IMPLANTS:  None. ESTIMATED BLOOD LOSS:  Minimal.    DISPOSITION:  Stable to PACU. INDICATIONS FOR THE PROCEDURE:  The patient is a 33-year-old male who is an IV drug abuser and currently an inmate who presented to the hospital with evidence of osteomyelitis of the right fifth finger at the distal phalanx after he noted that he hit his finger with a hammer a few weeks ago. He has noted some drainage and MRI showed evidence of osteomyelitis. Based on the extensive necrotic tissues and obvious osteomyelitis, and after thorough discussion with a Hand specialist, we decided that the best option for him in this situation would be a distal fingertip amputation. We discussed risk of infection continuing, neurovascular injury, anesthesia risks, risks secondary to the patient's comorbidities, and the complications that may come from fingertip amputation. We obtained consent and as the patient was medically optimized and on antibiotics, he was taken to the OR.     PROCEDURE:  The patient was taken to the OR and transferred to the operating room table. He was placed in the supine position and all prominences were carefully padded. He was given sedation and LMA was placed by Anesthesia. Sterile prepping and draping was performed. After sterile prepping and draping, a time-out was called. The patient was identified by name, date of birth, operative site, and operative procedure. After all were in agreement that the right hand was the operative extremity and tourniquet was inflated to 250 mmHg, we started by assessing his wound. The necrotic tissues were probed and this easily extended down to the bone of the distal phalanx. There was some purulence and this was cultured to be sent for analysis with the anaerobic and aerobic cultures. We started debriding with sharp dissection using a knife and we were able to remove necrotic tissues, which extended down to the mid portion of the middle phalanx on the ulnar side. As we carefully debrided down, we finally gained access to healthy tissues. Based on the level of healthy tissue, it was decided that the distal portion of the middle phalanx would have to be amputated as well. After removing the distal phalanx and the distal tip of the middle phalanx, we had adequate soft tissue coverage and healthy tissue for coverage over top of this bony prominence. We thoroughly irrigated with copious amounts of sterile saline solution and took care to cauterize the digital vessels of the very tip. We then closed with combination of 4-0 Monocryl suture and 3-0 Monocryl suture. After closure, an appropriate wound reapproximations and sterile dressings were applied. The tourniquet was deflated. The fingertip will be sent for analysis to pathology for his osteomyelitis. He was transferred to the PACU and would be monitored closely.   He will be monitored in the hospital on the antibiotics and will likely be able to be transitioned to oral antibiotics as we have removed the area of oren Mccartyh DO [id number]  D:  12/17/2021 12:41  T:  12/17/2021 21:48  JOB #:  [id number]

## 2021-12-18 NOTE — PROGRESS NOTES
Piute retching from room,  Pt had eaten everything on his tray for lunch and then stuck his finger down his throat prodcuing about 50 ml of emesis,  Enc patient to take deep breaths and not do that anymore,.

## 2021-12-18 NOTE — PROGRESS NOTES
ORTHO - Progress Note  Post Op day: 1 Day Post-Op    Maldonado Valiente     [id number]  male    36 y.o.    1990    Admit date:2021  Date of Surgery:2021   Procedures:Procedure(s):  RIGHT FIFTH DIGIT INCISION AND DRAINAGE, PARTIAL AMPUTATION  Surgeon:Surgeon(s) and Role:     Migue Dorman, DO - Primary        SUBJECTIVE:     Maldonado Valiente is a 36 y.o. male resting in the bed. Patient has complaints of appropriate post-op pain, pain medication with roxicdone 5mg, 1 mg Morphine q 3 prn, and 30 mg Toradol q 6 prn. Denies F/C, nausea, vomiting, dizziness, lightheadedness, chest pain, or shortness of breath. OBJECTIVE:       Physical Exam:  General: alert, cooperative, no distress. Gastrointestinal:  non-distended . Cardiovascular: equal pulses in the upper. Brisk cap refill in all distal extremities   Genitourinary: Voiding independently   Respiratory: No respiratory distress   Neurological:Neurovascular exam within normal limits. Senstion intact: Upper extremity bilat. Motor: able to move all digits right hand. Musculoskeletal: Jamari's sign negative in bilateral lower extremities. Dressing/Wound:  Clean, dry and intact over the 5th digit right hand. No significant erythema or swelling.     Vital Signs:       Patient Vitals for the past 8 hrs:   BP Temp Pulse Resp SpO2   21 1155 115/74 99 °F (37.2 °C) 88 18 95 %   21 0808 116/82 98.4 °F (36.9 °C) 89 18 99 %                                          Temp (24hrs), Av.5 °F (36.9 °C), Min:97.7 °F (36.5 °C), Max:99 °F (37.2 °C)    Date 21 0700 - 21 0659   Shift 1690-0321 5642-7751 3480-2141 24 Hour Total   INTAKE   P.O. 600   600   Shift Total(mL/kg) 600(11)   600(11)   OUTPUT   Urine(mL/kg/hr) 700   700   Emesis/NG output 50   50   Shift Total(mL/kg) 750(13.8)   750(13.8)   Weight (kg) 54.4 54.4 54.4 54.4     Labs:        Recent Labs     21  0115   HCT 41.4   HGB 13.7     PT/OT:              ASSESSMENT / PLAN:   Active Problems:    Osteomyelitis of finger of right hand ([facility] [address] (12/14/2021)      Severe protein-calorie malnutrition ([facility] [address] (12/16/2021)         -  Continue dressing surgical site; patient is continually removing bandage according to nursing staff. Discussed attempting to just keep some bandaging over the area to protect the surgical site and for any drainage  - Patient complaining of pain, but is resting comfortably on exam.   - Elevate and ice for continued pain control  - Current regimen is sufficient for pain control.  Patient will likely continue to have some discomfort due to use of fentanyl abuse prior to admission.  -  DVT prophylaxis- SCD   -  DC planning - Will return to [facility]     Signed By: DEMARCO Thompson

## 2021-12-19 LAB
ANION GAP SERPL CALC-SCNC: 7 MMOL/L (ref 5–15)
BACTERIA SPEC CULT: NORMAL
BUN SERPL-MCNC: 12 MG/DL (ref 6–20)
BUN/CREAT SERPL: 16 (ref 12–20)
CALCIUM SERPL-MCNC: 9.2 MG/DL (ref 8.5–10.1)
CHLORIDE SERPL-SCNC: 99 MMOL/L (ref 97–108)
CO2 SERPL-SCNC: 26 MMOL/L (ref 21–32)
CREAT SERPL-MCNC: 0.73 MG/DL (ref 0.7–1.3)
GLUCOSE BLD STRIP.AUTO-MCNC: 153 MG/DL (ref 65–117)
GLUCOSE SERPL-MCNC: 115 MG/DL (ref 65–100)
POTASSIUM SERPL-SCNC: 4 MMOL/L (ref 3.5–5.1)
SERVICE CMNT-IMP: ABNORMAL
SERVICE CMNT-IMP: NORMAL
SODIUM SERPL-SCNC: 132 MMOL/L (ref 136–145)
VANCOMYCIN SERPL-MCNC: 8.8 UG/ML

## 2021-12-19 PROCEDURE — 80048 BASIC METABOLIC PNL TOTAL CA: CPT

## 2021-12-19 PROCEDURE — 74011250636 HC RX REV CODE- 250/636: Performed by: ORTHOPAEDIC SURGERY

## 2021-12-19 PROCEDURE — 74011250637 HC RX REV CODE- 250/637: Performed by: GENERAL ACUTE CARE HOSPITAL

## 2021-12-19 PROCEDURE — 82962 GLUCOSE BLOOD TEST: CPT

## 2021-12-19 PROCEDURE — 36415 COLL VENOUS BLD VENIPUNCTURE: CPT

## 2021-12-19 PROCEDURE — 80202 ASSAY OF VANCOMYCIN: CPT

## 2021-12-19 PROCEDURE — 74011250636 HC RX REV CODE- 250/636: Performed by: GENERAL ACUTE CARE HOSPITAL

## 2021-12-19 PROCEDURE — 65660000000 HC RM CCU STEPDOWN

## 2021-12-19 PROCEDURE — 74011000258 HC RX REV CODE- 258: Performed by: ORTHOPAEDIC SURGERY

## 2021-12-19 PROCEDURE — 74011250637 HC RX REV CODE- 250/637: Performed by: ORTHOPAEDIC SURGERY

## 2021-12-19 PROCEDURE — 2709999900 HC NON-CHARGEABLE SUPPLY

## 2021-12-19 RX ADMIN — CEFEPIME HYDROCHLORIDE 2 G: 2 INJECTION, POWDER, FOR SOLUTION INTRAVENOUS at 09:08

## 2021-12-19 RX ADMIN — Medication 10 ML: at 05:58

## 2021-12-19 RX ADMIN — Medication 10 ML: at 08:40

## 2021-12-19 RX ADMIN — CEFEPIME HYDROCHLORIDE 2 G: 2 INJECTION, POWDER, FOR SOLUTION INTRAVENOUS at 17:18

## 2021-12-19 RX ADMIN — Medication 100 MG: at 08:38

## 2021-12-19 RX ADMIN — GABAPENTIN 100 MG: 100 CAPSULE ORAL at 17:19

## 2021-12-19 RX ADMIN — KETOROLAC TROMETHAMINE 30 MG: 30 INJECTION, SOLUTION INTRAMUSCULAR; INTRAVENOUS at 14:21

## 2021-12-19 RX ADMIN — ACETAMINOPHEN 650 MG: 325 TABLET ORAL at 11:44

## 2021-12-19 RX ADMIN — OXYCODONE 5 MG: 5 TABLET ORAL at 05:40

## 2021-12-19 RX ADMIN — OXYCODONE 5 MG: 5 TABLET ORAL at 11:49

## 2021-12-19 RX ADMIN — CEFEPIME HYDROCHLORIDE 2 G: 2 INJECTION, POWDER, FOR SOLUTION INTRAVENOUS at 01:27

## 2021-12-19 RX ADMIN — ACETAMINOPHEN 650 MG: 325 TABLET ORAL at 23:21

## 2021-12-19 RX ADMIN — Medication 10 ML: at 14:22

## 2021-12-19 RX ADMIN — MORPHINE SULFATE 1 MG: 2 INJECTION, SOLUTION INTRAMUSCULAR; INTRAVENOUS at 22:35

## 2021-12-19 RX ADMIN — VANCOMYCIN HYDROCHLORIDE 1000 MG: 1 INJECTION, POWDER, LYOPHILIZED, FOR SOLUTION INTRAVENOUS at 14:24

## 2021-12-19 RX ADMIN — GABAPENTIN 100 MG: 100 CAPSULE ORAL at 08:38

## 2021-12-19 RX ADMIN — ALPRAZOLAM 0.5 MG: 0.5 TABLET ORAL at 11:44

## 2021-12-19 RX ADMIN — ACETAMINOPHEN 650 MG: 325 TABLET ORAL at 17:23

## 2021-12-19 RX ADMIN — VANCOMYCIN HYDROCHLORIDE 1250 MG: 10 INJECTION, POWDER, LYOPHILIZED, FOR SOLUTION INTRAVENOUS at 06:16

## 2021-12-19 RX ADMIN — FAMOTIDINE 20 MG: 20 TABLET ORAL at 17:19

## 2021-12-19 RX ADMIN — ACETAMINOPHEN 650 MG: 325 TABLET ORAL at 06:16

## 2021-12-19 RX ADMIN — Medication 10 ML: at 16:29

## 2021-12-19 RX ADMIN — FAMOTIDINE 20 MG: 20 TABLET ORAL at 08:38

## 2021-12-19 RX ADMIN — OXYCODONE 5 MG: 5 TABLET ORAL at 17:24

## 2021-12-19 RX ADMIN — VANCOMYCIN HYDROCHLORIDE 1000 MG: 1 INJECTION, POWDER, LYOPHILIZED, FOR SOLUTION INTRAVENOUS at 22:34

## 2021-12-19 RX ADMIN — MORPHINE SULFATE 1 MG: 2 INJECTION, SOLUTION INTRAMUSCULAR; INTRAVENOUS at 08:39

## 2021-12-19 RX ADMIN — MORPHINE SULFATE 1 MG: 2 INJECTION, SOLUTION INTRAMUSCULAR; INTRAVENOUS at 16:29

## 2021-12-19 RX ADMIN — ONDANSETRON 4 MG: 2 INJECTION INTRAMUSCULAR; INTRAVENOUS at 18:50

## 2021-12-19 RX ADMIN — ALPRAZOLAM 0.5 MG: 0.5 TABLET ORAL at 23:44

## 2021-12-19 RX ADMIN — HEPARIN SODIUM 5000 UNITS: 5000 INJECTION INTRAVENOUS; SUBCUTANEOUS at 22:35

## 2021-12-19 RX ADMIN — DOCUSATE SODIUM 50MG AND SENNOSIDES 8.6MG 2 TABLET: 8.6; 5 TABLET, FILM COATED ORAL at 08:38

## 2021-12-19 RX ADMIN — Medication 10 ML: at 22:00

## 2021-12-19 RX ADMIN — FOLIC ACID 1 MG: 1 TABLET ORAL at 08:38

## 2021-12-19 NOTE — PROGRESS NOTES
Pharmacy Antimicrobial Kinetic Dosing    Indication for Antimicrobials: OM (confirmed with x-ray)    Current Regimen of Each Antimicrobial:  Vancomycin, pharmacy to dose (Start Date ; Day # 6)  Cefepime 2g IV q8h (start ; day 6)    Previous Antimicrobial Therapy:    Goal Level: AUC: 400-600 mg/hr/Liter/day    Date Dose & Interval Measured (mcg/mL) Predicted AUC/RADHA    @ 0406 Vanc 1gm iv q12h 9.4 412    @ 616 1250 q 12h 8.8            Date & time of next level:     Dosing calculator used: [employer] calculator    Significant Positive Cultures:    pbcx - NG -Prelim   Wound - S. Aureus   Anaerobic NG - prelim    Conditions for Dosing Consideration: None   Labs:  Recent Labs     21  0115 21  0435 21  0406   CREA 0.78 0.82 0.85   BUN 18 14 14     Recent Labs     21  0115 21  0435 21  0406   WBC 12.7* 14.0* 14.1*     Temp (24hrs), Av.5 °F (36.9 °C), Min:97.7 °F (36.5 °C), Max:99 °F (37.2 °C)        Creatinine Clearance (mL/min):   actual weight < IBW: CrCl (Actual Body Weight) 107.6    Impression/Plan:   Hx of Heroin abuse (possible infection source)  Blood Cx Neg so far  Continue cefepime as above  WBCs and Srcr stable  Change vancomycin to 1000 mg IV Q8H for predicted level of   Antimicrobial stop date TBD     Pharmacy will follow daily and adjust medications as appropriate for renal function and/or serum levels.     Thank you,  Juan Bender, PHARMD

## 2021-12-19 NOTE — PROGRESS NOTES
End of Shift Note    Bedside shift change report given to  Lola Christie (oncoming nurse) by General Handing, RN (offgoing nurse). Report included the following information SBAR, Kardex, Intake/Output, MAR and Recent Results    Shift worked:  7a to 7p     Shift summary and any significant changes:     Placed on Contact isolation for MRSA in wound culture from amputated finger. Concerns for physician to address: Pain control      Zone phone for oncoming shift:   [phone]     Activity:  Activity Level: Up with Assistance  Number times ambulated in hallways past shift: 0, ambulated in room  Number of times OOB to chair past shift: 0, sits upright in bed. Cardiac:   Cardiac Monitoring: No      Cardiac Rhythm: Sinus Rhythm    Access:   Current line(s): PIV     Genitourinary:   Urinary status: voiding    Respiratory:   O2 Device: None (Room air)  Chronic home O2 use?: NO  Incentive spirometer at bedside: N/A     GI:  Last Bowel Movement Date: 12/19/21  Current diet:  DIET ONE TIME MESSAGE  ADULT ORAL NUTRITION SUPPLEMENT Breakfast, Lunch, Dinner; Standard High Calorie/High Protein  ADULT DIET Regular  DIET ONE TIME MESSAGE  DIET ONE TIME MESSAGE  Passing flatus: YES  Tolerating current diet: YES       Pain Management:   Patient states pain is manageable on current regimen: YES    Skin:  Bill Score: 22  Interventions: increase time out of bed    Patient Safety:  Fall Score:  Total Score: 1  Interventions: gripper socks  High Fall Risk: Yes    Length of Stay:  Expected LOS: 4d 0h  Actual LOS: [address] [place], RN

## 2021-12-19 NOTE — PROGRESS NOTES
Received message from patient's nurse stating:    Pt is currently experiencing nausea and vomiting. PRN Zofran given at 1700. Next available dose is at 2300. Pt has PO PRN Phenergan ordered. Could route be changed to IV? Discussion / orders:    Ordered Compazine 10 mg IV x1             Please note that this note was dictated using Dragon computer voice recognition software. Quite often unanticipated grammatical, syntax, homophones, and other interpretive errors are inadvertently transcribed by the computer software. Please disregard these errors. Please excuse any errors that have escaped final proofreading.      Signed by:  Kacey Brandt DNP, ACNP-BC

## 2021-12-19 NOTE — PROGRESS NOTES
ORTHO - Progress Note  Post Op day: 2 Days Post-Op    Kenneth Styles     [id number]  male    36 y.o.    1990    Admit date:2021  Date of Surgery:2021   Procedures:Procedure(s):  RIGHT FIFTH DIGIT INCISION AND DRAINAGE, PARTIAL AMPUTATION  Surgeon:Surgeon(s) and Role:     Wendi Brush, DO - Primary        SUBJECTIVE:     Kenneth Styles is a 36 y.o. male resting in the chair. Patient has complaints of pain, tolerating currently on Tylenol, Toradol 30 mg q 6, Morphine 1 mg q6 and oxycodone 5mg q 6 . Complaints of chronic back pain worsening today from laying in bed. His surgical site is not as painful today. Denies F/C, nausea, vomiting, dizziness, lightheadedness, chest pain, or shortness of breath. OBJECTIVE:       Physical Exam:  General: alert, cooperative, no distress. Gastrointestinal:  non-distended . Cardiovascular: equal pulses in the lower extremities,  Brisk cap refill in all distal extremities   Genitourinary: Voiding independently   Respiratory: No respiratory distress   Neurological:Neurovascular exam within normal limits. Senstion intact: LE bilat. Motor: + DF/PF/EHL. Musculoskeletal: Patient moving all digits with no difficulty. Dressing/Wound:  Minimal drainage over dressing; after removal no sign of compromise or drainage at surgical site. No significant erythema or swelling. Vital Signs:       Patient Vitals for the past 8 hrs:   BP Temp Pulse Resp SpO2   21 0827 126/81 98.7 °F (37.1 °C) 86 18 100 %   21 0308 (!) 154/91 99.4 °F (37.4 °C) (!) 59 18 97 %                                          Temp (24hrs), Av.1 °F (37.3 °C), Min:98.7 °F (37.1 °C), Max:99.4 °F (37.4 °C)    Date 21 0700 - 21 0659   Shift 1340-9818 9595-4358 0917-0184 24 Hour Total   INTAKE   P.O. 480   480   I. V.(mL/kg/hr) 100   100   Shift Total(mL/kg) 580(10.7)   580(10.7)   OUTPUT   Urine(mL/kg/hr) 875   875   Shift Total(mL/kg) 875(16.1)   875(16.1)   Weight (kg) 54.4 54.4 54.4 54.4     Labs:        Recent Labs     12/18/21  0115   HCT 41.4   HGB 13.7     PT/OT:              ASSESSMENT / PLAN:   Active Problems:    Osteomyelitis of finger of right hand ([facility] [address] (12/14/2021)      Severe protein-calorie malnutrition ([facility] [address] (12/16/2021)         - Surgical site bandage changed today; continue to dress the site for potential drainage  - Discussed with patient elevating and icing the area  - Current regimen is sufficient for pain control. Patient will likely continue to have some discomfort due to use of fentanyl abuse prior to admission. His pain of surgical site has been improving he states. - no growth on anaerobic cultures thus far; Rare Staph aureus noted on other wound culture;  Patient currently on Cefepime and Vancomycin   -  DVT prophylaxis- SCD   -  DC planning [facility]    Signed By: DEMARCO Garcia

## 2021-12-19 NOTE — PROGRESS NOTES
Spiritual Care Assessment/Progress Note  [facility]      NAME: Maryam Herrmann      MRN: [id number]  AGE: 36 y.o.  SEX: male  Samaritan Affiliation: No Taoist   Language: English     12/19/2021     Total Time (in minutes): 28     Spiritual Assessment begun in [facility] 3 SURG TELE through conversation with:         [x]Patient        [] Family    [] Friend(s)        Reason for Consult: Initial/Spiritual assessment, patient floor     Spiritual beliefs: (Please include comment if needed)     [x] Identifies with a roberto tradition:  Amish         [] Supported by a roberto community:            [] Claims no spiritual orientation:           [] Seeking spiritual identity:                [] Adheres to an individual form of spirituality:           [] Not able to assess:                           Identified resources for coping:      [x] Prayer                               [] Music                  [] Guided Imagery     [] Family/friends                 [] Pet visits     [] Devotional reading                         [] Unknown     [] Other:                                               Interventions offered during this visit: (See comments for more details)    Patient Interventions: Affirmation of emotions/emotional suffering,Initial/Spiritual assessment, patient floor,Life review/legacy,Normalization of emotional/spiritual concerns,Prayer (actual),Coping skills reviewed/reinforced,Samaritan beliefs/image of God discussed,Catharsis/review of pertinent events in supportive environment,Affirmation of roberto,Iconic (affirming the presence of God/Higher Power)           Plan of Care:     [] Support spiritual and/or cultural needs    [] Support AMD and/or advance care planning process      [] Support grieving process   [] Coordinate Rites and/or Rituals    [] Coordination with community clergy   [] No spiritual needs identified at this time   [] Detailed Plan of Care below (See Comments)  [] Make referral to Music Therapy  [] Make referral to Pet Therapy     [] Make referral to Addiction services  [] Make referral to [facility]  [] Make referral to Spiritual Care Partner  [] No future visits requested        [x] Contact Spiritual Care for further referrals     Comments:  Visit was on 3 , for initial spiritual assessment. Patient had two guard present, one at his door another in his room. He welcomed  warmly and engaged in conversation. He stated that his greatest desire was to be home for aby with his family. He shared his struggles stating he has no control over the things he does. He stated that he however believes in God and the scriptures and that he believes Roby  for him. Patient sat in bed and requested for prayer. He became very emotional and tearful during time of prayer.  spoke calm words of comfort, affirming God's love for him. He was assured of 's continuous prayer. He stated that visit and prayer meant a lot and he expressed appreciation for the support. Visited by: Leila Mcleod.    Paging Service: [phone])

## 2021-12-20 ENCOUNTER — APPOINTMENT (OUTPATIENT)
Dept: GENERAL RADIOLOGY | Age: 31
DRG: 316 | End: 2021-12-20
Attending: GENERAL ACUTE CARE HOSPITAL
Payer: MEDICAID

## 2021-12-20 LAB
ANION GAP SERPL CALC-SCNC: 6 MMOL/L (ref 5–15)
BACTERIA SPEC CULT: ABNORMAL
BUN SERPL-MCNC: 12 MG/DL (ref 6–20)
BUN/CREAT SERPL: 15 (ref 12–20)
CALCIUM SERPL-MCNC: 9.4 MG/DL (ref 8.5–10.1)
CHLORIDE SERPL-SCNC: 100 MMOL/L (ref 97–108)
CO2 SERPL-SCNC: 31 MMOL/L (ref 21–32)
CREAT SERPL-MCNC: 0.79 MG/DL (ref 0.7–1.3)
ERYTHROCYTE [DISTWIDTH] IN BLOOD BY AUTOMATED COUNT: 14.2 % (ref 11.5–14.5)
GLUCOSE BLD STRIP.AUTO-MCNC: 112 MG/DL (ref 65–117)
GLUCOSE BLD STRIP.AUTO-MCNC: 113 MG/DL (ref 65–117)
GLUCOSE SERPL-MCNC: 107 MG/DL (ref 65–100)
GRAM STN SPEC: ABNORMAL
GRAM STN SPEC: ABNORMAL
HCT VFR BLD AUTO: 42.2 % (ref 36.6–50.3)
HGB BLD-MCNC: 13.9 G/DL (ref 12.1–17)
MCH RBC QN AUTO: 28.3 PG (ref 26–34)
MCHC RBC AUTO-ENTMCNC: 32.9 G/DL (ref 30–36.5)
MCV RBC AUTO: 85.9 FL (ref 80–99)
NRBC # BLD: 0 K/UL (ref 0–0.01)
NRBC BLD-RTO: 0 PER 100 WBC
PLATELET # BLD AUTO: 443 K/UL (ref 150–400)
PMV BLD AUTO: 8.6 FL (ref 8.9–12.9)
POTASSIUM SERPL-SCNC: 4.3 MMOL/L (ref 3.5–5.1)
RBC # BLD AUTO: 4.91 M/UL (ref 4.1–5.7)
SERVICE CMNT-IMP: ABNORMAL
SERVICE CMNT-IMP: NORMAL
SERVICE CMNT-IMP: NORMAL
SODIUM SERPL-SCNC: 137 MMOL/L (ref 136–145)
WBC # BLD AUTO: 11.3 K/UL (ref 4.1–11.1)

## 2021-12-20 PROCEDURE — 85027 COMPLETE CBC AUTOMATED: CPT

## 2021-12-20 PROCEDURE — 82962 GLUCOSE BLOOD TEST: CPT

## 2021-12-20 PROCEDURE — 74011250637 HC RX REV CODE- 250/637: Performed by: ORTHOPAEDIC SURGERY

## 2021-12-20 PROCEDURE — 65660000000 HC RM CCU STEPDOWN

## 2021-12-20 PROCEDURE — 74011250636 HC RX REV CODE- 250/636: Performed by: ORTHOPAEDIC SURGERY

## 2021-12-20 PROCEDURE — 36415 COLL VENOUS BLD VENIPUNCTURE: CPT

## 2021-12-20 PROCEDURE — 74011000250 HC RX REV CODE- 250: Performed by: ORTHOPAEDIC SURGERY

## 2021-12-20 PROCEDURE — 80048 BASIC METABOLIC PNL TOTAL CA: CPT

## 2021-12-20 PROCEDURE — 74018 RADEX ABDOMEN 1 VIEW: CPT

## 2021-12-20 PROCEDURE — 74011250636 HC RX REV CODE- 250/636: Performed by: GENERAL ACUTE CARE HOSPITAL

## 2021-12-20 PROCEDURE — 74011250637 HC RX REV CODE- 250/637: Performed by: INTERNAL MEDICINE

## 2021-12-20 PROCEDURE — 99233 SBSQ HOSP IP/OBS HIGH 50: CPT | Performed by: INTERNAL MEDICINE

## 2021-12-20 PROCEDURE — 74011000258 HC RX REV CODE- 258: Performed by: ORTHOPAEDIC SURGERY

## 2021-12-20 PROCEDURE — 74011250637 HC RX REV CODE- 250/637: Performed by: GENERAL ACUTE CARE HOSPITAL

## 2021-12-20 RX ORDER — METOCLOPRAMIDE HYDROCHLORIDE 5 MG/ML
5 INJECTION INTRAMUSCULAR; INTRAVENOUS EVERY 6 HOURS
Status: DISPENSED | OUTPATIENT
Start: 2021-12-20 | End: 2021-12-22

## 2021-12-20 RX ORDER — POLYETHYLENE GLYCOL 3350 17 G/17G
17 POWDER, FOR SOLUTION ORAL DAILY
Status: DISCONTINUED | OUTPATIENT
Start: 2021-12-20 | End: 2021-12-22 | Stop reason: HOSPADM

## 2021-12-20 RX ORDER — MUPIROCIN 20 MG/G
OINTMENT TOPICAL 2 TIMES DAILY
Status: DISCONTINUED | OUTPATIENT
Start: 2021-12-20 | End: 2021-12-22 | Stop reason: HOSPADM

## 2021-12-20 RX ORDER — PANTOPRAZOLE SODIUM 40 MG/1
40 TABLET, DELAYED RELEASE ORAL
Status: DISCONTINUED | OUTPATIENT
Start: 2021-12-20 | End: 2021-12-22 | Stop reason: HOSPADM

## 2021-12-20 RX ADMIN — OXYCODONE 5 MG: 5 TABLET ORAL at 10:53

## 2021-12-20 RX ADMIN — ONDANSETRON 4 MG: 2 INJECTION INTRAMUSCULAR; INTRAVENOUS at 06:36

## 2021-12-20 RX ADMIN — VANCOMYCIN HYDROCHLORIDE 1000 MG: 1 INJECTION, POWDER, LYOPHILIZED, FOR SOLUTION INTRAVENOUS at 06:40

## 2021-12-20 RX ADMIN — ACETAMINOPHEN 650 MG: 325 TABLET ORAL at 11:51

## 2021-12-20 RX ADMIN — VANCOMYCIN HYDROCHLORIDE 1000 MG: 1 INJECTION, POWDER, LYOPHILIZED, FOR SOLUTION INTRAVENOUS at 15:34

## 2021-12-20 RX ADMIN — CEFEPIME HYDROCHLORIDE 2 G: 2 INJECTION, POWDER, FOR SOLUTION INTRAVENOUS at 05:20

## 2021-12-20 RX ADMIN — PANTOPRAZOLE SODIUM 40 MG: 40 TABLET, DELAYED RELEASE ORAL at 15:34

## 2021-12-20 RX ADMIN — PROMETHAZINE HYDROCHLORIDE 25 MG: 25 TABLET ORAL at 04:08

## 2021-12-20 RX ADMIN — KETOROLAC TROMETHAMINE 30 MG: 30 INJECTION, SOLUTION INTRAMUSCULAR; INTRAVENOUS at 23:04

## 2021-12-20 RX ADMIN — ONDANSETRON 4 MG: 2 INJECTION INTRAMUSCULAR; INTRAVENOUS at 21:43

## 2021-12-20 RX ADMIN — MORPHINE SULFATE 1 MG: 2 INJECTION, SOLUTION INTRAMUSCULAR; INTRAVENOUS at 16:52

## 2021-12-20 RX ADMIN — Medication 10 ML: at 06:29

## 2021-12-20 RX ADMIN — GABAPENTIN 100 MG: 100 CAPSULE ORAL at 08:32

## 2021-12-20 RX ADMIN — Medication 10 ML: at 13:32

## 2021-12-20 RX ADMIN — KETOROLAC TROMETHAMINE 30 MG: 30 INJECTION, SOLUTION INTRAMUSCULAR; INTRAVENOUS at 15:34

## 2021-12-20 RX ADMIN — DOCUSATE SODIUM 50MG AND SENNOSIDES 8.6MG 2 TABLET: 8.6; 5 TABLET, FILM COATED ORAL at 08:32

## 2021-12-20 RX ADMIN — OXYCODONE 5 MG: 5 TABLET ORAL at 20:04

## 2021-12-20 RX ADMIN — KETOROLAC TROMETHAMINE 30 MG: 30 INJECTION, SOLUTION INTRAMUSCULAR; INTRAVENOUS at 04:08

## 2021-12-20 RX ADMIN — FOLIC ACID 1 MG: 1 TABLET ORAL at 08:32

## 2021-12-20 RX ADMIN — ONDANSETRON 4 MG: 2 INJECTION INTRAMUSCULAR; INTRAVENOUS at 13:35

## 2021-12-20 RX ADMIN — Medication 100 MG: at 08:32

## 2021-12-20 RX ADMIN — VANCOMYCIN HYDROCHLORIDE 1000 MG: 1 INJECTION, POWDER, LYOPHILIZED, FOR SOLUTION INTRAVENOUS at 23:04

## 2021-12-20 RX ADMIN — MUPIROCIN: 20 OINTMENT TOPICAL at 20:13

## 2021-12-20 RX ADMIN — ONDANSETRON 4 MG: 2 INJECTION INTRAMUSCULAR; INTRAVENOUS at 01:08

## 2021-12-20 RX ADMIN — HEPARIN SODIUM 5000 UNITS: 5000 INJECTION INTRAVENOUS; SUBCUTANEOUS at 20:12

## 2021-12-20 RX ADMIN — OXYCODONE 5 MG: 5 TABLET ORAL at 04:08

## 2021-12-20 RX ADMIN — POLYETHYLENE GLYCOL 3350 17 G: 17 POWDER, FOR SOLUTION ORAL at 11:51

## 2021-12-20 RX ADMIN — FAMOTIDINE 20 MG: 20 TABLET ORAL at 08:32

## 2021-12-20 RX ADMIN — MUPIROCIN: 20 OINTMENT TOPICAL at 13:35

## 2021-12-20 RX ADMIN — MORPHINE SULFATE 1 MG: 2 INJECTION, SOLUTION INTRAMUSCULAR; INTRAVENOUS at 08:33

## 2021-12-20 RX ADMIN — PROMETHAZINE HYDROCHLORIDE 25 MG: 25 TABLET ORAL at 10:53

## 2021-12-20 RX ADMIN — METOCLOPRAMIDE 5 MG: 5 INJECTION, SOLUTION INTRAMUSCULAR; INTRAVENOUS at 11:51

## 2021-12-20 RX ADMIN — ACETAMINOPHEN 650 MG: 325 TABLET ORAL at 17:45

## 2021-12-20 RX ADMIN — METOCLOPRAMIDE 5 MG: 5 INJECTION, SOLUTION INTRAMUSCULAR; INTRAVENOUS at 17:45

## 2021-12-20 RX ADMIN — GABAPENTIN 100 MG: 100 CAPSULE ORAL at 17:46

## 2021-12-20 RX ADMIN — HEPARIN SODIUM 5000 UNITS: 5000 INJECTION INTRAVENOUS; SUBCUTANEOUS at 08:32

## 2021-12-20 RX ADMIN — Medication 10 ML: at 21:47

## 2021-12-20 NOTE — PROGRESS NOTES
ORTHO - Progress Note  Post Op day: 3 Days Post-Op    Johanne Whitmore     [id number]  male    36 y.o.    1990    Admit date:2021  Date of Surgery:2021   Procedures:Procedure(s):  RIGHT FIFTH DIGIT INCISION AND DRAINAGE, PARTIAL AMPUTATION  Surgeon:Surgeon(s) and Role:     Tj Macias, DO - Primary        SUBJECTIVE:     Johanne Whitmore is a 36 y.o. male resting in the bed. Patient has complaints of appropriate post-op pain, tolerating PO pain medications Tylenol, Toradol 30 mg q 6, Morphine 1 mg q6 and oxycodone 5mg q 6 . Less complaints of pain today than previous encounters. Denies F/C, nausea, vomiting, dizziness, lightheadedness, chest pain, or shortness of breath. OBJECTIVE:       Physical Exam:  General: alert, cooperative, no distress. Gastrointestinal:  non-distended . Cardiovascular: equal pulses in the upper. Brisk cap refill in all distal extremities   Genitourinary: Voiding independently   Respiratory: No respiratory distress   Neurological:Neurovascular exam within normal limits. Senstion intact: RUE   Musculoskeletal: No sign of compromise at surgical site. Motor function intact of all digits. Dressing/Wound:  Clean, dry and intact. No significant erythema or swelling.     Vital Signs:       Patient Vitals for the past 8 hrs:   BP Temp Pulse Resp SpO2   21 1107 (!) 157/90 98.3 °F (36.8 °C) 60 18 100 %   21 0818 139/85 98.7 °F (37.1 °C) 61 17 100 %   21 0355 128/81 98.1 °F (36.7 °C) (!) 58 18 99 %                                          Temp (24hrs), Av.4 °F (36.9 °C), Min:98.1 °F (36.7 °C), Max:98.7 °F (37.1 °C)    Date 21 0700 - 21 0659   Shift 1552-8622 4407-3041 0780-8628 24 Hour Total   INTAKE   P.O. 100   100   Shift Total(mL/kg) 100(1.8)   100(1.8)   OUTPUT   Urine(mL/kg/hr) 1200   1200   Shift Total(mL/kg) 5510(47)   3756(22)   Weight (kg) 54.4 54.4 54.4 54.4     Labs:        Recent Labs     21  0355   HCT 42.2   HGB 13.9     PT/OT:              ASSESSMENT / PLAN:   Active Problems:    [facility] (12/14/2021)      Severe protein-calorie malnutrition ([facility] (12/16/2021)         -  Less complaints of pain today. Patient still appears comfortable on exam  - does have complaints of continued nausea which he is receiving zofran and phenergan for.  - Continue dressing surgical site.   - IV abx with cefepime and Vancomycin  -  DVT prophylaxis- SCD w/ Heparin  -  DC planning - Return to [facility]    Signed By: DEMARCO Edward

## 2021-12-20 NOTE — PROGRESS NOTES
Infectious Disease progress        IMPRESSION:     -Acute osteomyelitis of right 5th distal phalanx  H/o local trauma  S/p partial amputation of 5th digit 12/17. Culture - rare MRSA. Some purulence noted per Op note. Currently minimal erythema, suture line dry  Pt has been on Vancomycin, Cefepime since 12/14.      -S/p abdominal pain, N,V secondary to drug withdrawal  H/o heroin, fentanyl drug abuse.    -Chronic hep C  -HIV negative.    -History of polysubstance abuse    -Patient is currently incarcerated       PLAN:        -Continue empiric Vancomycin and Cefepime IV until 12/21( 7 days ) , thereafter changed to p.o. antibiotics-Keflex doxycycline for 2 more weeks. Continue probiotic during course of antibiotic therapy, avoid direct sun exposure during doxycycline. -MRSA decolonization-nasal mupirocin twice daily x5 days, Hibiclens skin washes. -MRSA contact precautions discussed with patient           Patient seen today  Denies new complaints. Afebrile. Pinky finger stump exam done  Suture line clean, no drainage  Minimal erythema at stump. Rebecca Calderon is a 36 y.o. male presented to the ED with cc of right finger pain and withdrawal.  The patient was booked into the [facility] halfway day PTA. Patient stated that he   slammed his right 5th finger with a hammer 3 weeks ago. He stated he developed blood blister which he burst, and continued to have pain. He is right-hand dominant. Pain is 9 out of 10 in severity, is worse with palpation. No history of fever or chills. Patient stated he believed he was withdrawing from fentanyl and heroin. He had vomited multiple times today, and had abdominal pain with each episode. Polysubstance abuse-  Regular fentanyl, heroin user. Accompanied by Jun Ghosh- pt incarcerated yesterday. Per chart history see hepatitis C positive, HIV testing done-negative  WBC 14.6 at time of admission.   Creatinine 0.86  X-ray fifth finger-12/14  INDINGS: Three views of the right fifth finger demonstrate patchy osteolysis  involving the tuft of the distal phalanx, concerning for acute osteomyelitis. Diffuse soft tissue swelling.     IMPRESSION  Findings concerning for acute osteomyelitis involving the tuft of  the fifth distal phalanx. MRI finger-12/16  FINDINGS: Bone marrow: On the prior radiographs, there was a mildly displaced  fracture through the distal phalangeal tuft with a small bone fragment distally. This bone fragment is not well identified on the current MRI. There is edema and  decreased T1 signal in the fifth distal phalanx. Mild edema is seen in the fifth  middle phalanx.     Joint fluid:  No significant joint effusion.     Tendons: Intact.     Muscles: Within normal limits.     Neurovascular bundles: Within normal limits.     Articular cartilage:Intact without focal osteochondral lesion.     Soft tissue mass: No mass. There is subcutaneous edema surrounding the fifth  finger. No evidence of a focal drainable fluid collection.     IMPRESSION  1. On the prior radiographs, there was a mildly displaced fracture through the  distal phalangeal tuft. The small displaced osseous fragment is not well-seen on  MRI. Abnormal signal in the fifth distal phalanx could be related to trauma  and/or infection. 2. Mild edema in the fifth middle phalanx may be reactive or related to early  osteomyelitis. 3. Diffuse subcutaneous edema in the fifth finger. No evidence of a focal  drainable fluid collection. Patient has been seen by Ortho on consult. Plan is for debridement partial amputation tomorrow. D/w Ortho PA. Send wounds specimen for aerobic and a anaerobic cultures please  Patient seen today. States he has significant pain that is not controlled with pain meds. Finger appears dark ,discolored. No drainage.     Patient Active Problem List   Diagnosis Code    Osteomyelitis of finger of right hand ([facility] [address] M86.9    Severe protein-calorie malnutrition ([facility] [address] E43 Past Medical History:   Diagnosis Date    No pertinent past medical history       History reviewed. No pertinent family history. Social History     Tobacco Use    Smoking status: Smoker, Current Status Unknown     Packs/day: 1.00     Years: 17.00     Pack years: 17.00    Smokeless tobacco: Never Used   Substance Use Topics    Alcohol use: Not Currently     History reviewed. No pertinent surgical history. Prior to Admission medications    Not on File     Allergies   Allergen Reactions    Codeine Itching        Review of Systems:  A comprehensive review of systems was negative except for that written in the History of Present Illness. 14 point review of systems obtained . All other systems negative    Objective:   Blood pressure (!) 157/90, pulse 60, temperature 98.3 °F (36.8 °C), resp. rate 18, height 5' 10\" (1.778 m), weight 120 lb (54.4 kg), SpO2 100 %.   Temp (24hrs), Av.4 °F (36.9 °C), Min:98.1 °F (36.7 °C), Max:98.7 °F (37.1 °C)    Current Facility-Administered Medications   Medication Dose Route Frequency    pantoprazole (PROTONIX) tablet 40 mg  40 mg Oral ACB&D    metoclopramide HCl (REGLAN) injection 5 mg  5 mg IntraVENous Q6H    polyethylene glycol (MIRALAX) packet 17 g  17 g Oral DAILY    mupirocin (BACTROBAN) 2 % ointment   Topical BID    vancomycin (VANCOCIN) 1,000 mg in 0.9% sodium chloride 250 mL (VIAL-MATE)  1,000 mg IntraVENous Q8H    heparin (porcine) injection 5,000 Units  5,000 Units SubCUTAneous Q12H    acetaminophen (TYLENOL) tablet 650 mg  650 mg Oral Q6H    naloxone (NARCAN) injection 0.4 mg  0.4 mg IntraVENous PRN    ketorolac (TORADOL) injection 30 mg  30 mg IntraVENous Q6H PRN    lidocaine 4 % patch 1 Patch  1 Patch TransDERmal Q24H    gabapentin (NEURONTIN) capsule 100 mg  100 mg Oral BID    oxyCODONE IR (ROXICODONE) tablet 5 mg  5 mg Oral Q6H PRN    morphine injection 1 mg  1 mg IntraVENous Q8H PRN    promethazine (PHENERGAN) tablet 25 mg  25 mg Oral Q6H PRN  [Held by provider] famotidine (PEPCID) tablet 20 mg  20 mg Oral BID    senna-docusate (PERICOLACE) 8.6-50 mg per tablet 2 Tablet  2 Tablet Oral DAILY    sodium chloride (NS) flush 5-40 mL  5-40 mL IntraVENous Q8H    sodium chloride (NS) flush 5-40 mL  5-40 mL IntraVENous PRN    acetaminophen (TYLENOL) tablet 650 mg  650 mg Oral Q6H PRN    Or    acetaminophen (TYLENOL) suppository 650 mg  650 mg Rectal Q6H PRN    polyethylene glycol (MIRALAX) packet 17 g  17 g Oral DAILY PRN    ondansetron (ZOFRAN ODT) tablet 4 mg  4 mg Oral Q8H PRN    Or    ondansetron (ZOFRAN) injection 4 mg  4 mg IntraVENous Q6H PRN    [Held by provider] cefepime (MAXIPIME) 2 g in 0.9% sodium chloride (MBP/ADV) 100 mL MBP  2 g IntraVENous W8Y    folic acid (FOLVITE) tablet 1 mg  1 mg Oral DAILY    thiamine mononitrate (B-1) tablet 100 mg  100 mg Oral DAILY        Exam:    General:  Awake, cooperative, disheveled   Eyes:  Sclera anicteric. Pupils equally round and reactive to light. Mouth/Throat: Mucous membranes normal, oral pharynx clear   Neck: Supple   Lungs:   Clear to auscultation bilaterally, good effort   CV:  Regular rate and rhythm,no murmur, click, rub or gallop   Abdomen:   Soft, non-tender. bowel sounds normal. non-distended   Extremities: No  edema   Skin: Skin color, texture, turgor normal. no acute rash or lesions   Lymph nodes: Cervical and supraclavicular normal   Musculoskeletal: Swelling, r.5th finger distal phalanx-dark discoloration, no drainage   Lines/Devices:  Intact, no erythema, drainage or tenderness   Psych: Alert and oriented, normal mood affect .        Data Reviewed:   CBC:   Recent Labs     12/20/21  0355 12/18/21  0115   WBC 11.3* 12.7*   RBC 4.91 4.93   HGB 13.9 13.7   HCT 42.2 41.4   * 467*   GRANS  --  61   LYMPH  --  30   EOS  --  3     CMP:   Recent Labs     12/20/21  0355 12/19/21  0545 12/18/21  0115   * 115* 94    132* 138   K 4.3 4.0 4.2    99 107   CO2 31 26 27 BUN 12 12 18   CREA 0.79 0.73 0.78   CA 9.4 9.2 8.9   AGAP 6 7 4*   BUCR 15 16 23*       Lab Results   Component Value Date/Time    Culture result: (A) 12/17/2021 11:57 AM     RARE * METHICILLIN RESISTANT STAPHYLOCOCCUS AUREUS *    Culture result: NO GROWTH THUS FAR 12/17/2021 11:57 AM    Culture result: NO GROWTH 5 DAYS 12/14/2021 03:32 PM          XR Results (most recent)reviewed:  Results from [place] encounter on 12/14/21    XR ABD (KUB)    Narrative  Clinical indication: Nausea vomiting. KUB obtained, moderate fecal stasis. Nonspecific otherwise. Impression  impression: Moderate fecal stasis. ICD-10-CM ICD-9-CM    1. Osteomyelitis of finger of right hand ([facility])  M86.9 730.24    2. Opiate withdrawal ([facility])  F11.23 292.0      304.00      Antibiotics  Vancomycin-12/14  Cefepime-12/14    I have discussed the diagnosis with the patient and the intended plan as seen in the above orders. I have discussed medication side effects and warnings with the patient as well.     Reviewed test results at length with patient    Signed By: Yessi Mcgregor MD FACP

## 2021-12-20 NOTE — PROGRESS NOTES
End of Shift Note    Bedside shift change report given to Saúl Terry RN (oncoming nurse) by Demi Pereira LPN (offgoing nurse). Report included the following information SBAR, Kardex, Intake/Output, MAR and Recent Results    Shift worked:  night     Shift summary and any significant changes: On Contact isolation for MRSA in wound culture from amputated finger. Concerns for physician to address: Pain control, discharge planning. Zone phone for oncoming shift:   [phone]     Activity:  Activity Level: Up with Assistance  Number times ambulated in hallways past shift: 0, ambulated in room  Number of times OOB to chair past shift: 0, sits upright in bed. Cardiac:   Cardiac Monitoring: No      Cardiac Rhythm: Sinus Rhythm    Access:   Current line(s): PIV     Genitourinary:   Urinary status: voiding    Respiratory:   O2 Device: None (Room air)  Chronic home O2 use?: NO  Incentive spirometer at bedside: N/A     GI:  Last Bowel Movement Date: 12/19/21  Current diet:  DIET ONE TIME MESSAGE  ADULT ORAL NUTRITION SUPPLEMENT Breakfast, Lunch, Dinner; Standard High Calorie/High Protein  ADULT DIET Regular  DIET ONE TIME MESSAGE  DIET ONE TIME MESSAGE  Passing flatus: YES  Tolerating current diet: YES       Pain Management:   Patient states pain is manageable on current regimen: YES    Skin:  Bill Score: 22  Interventions: increase time out of bed    Patient Safety:  Fall Score:  Total Score: 1  Interventions: gripper socks  High Fall Risk: Yes    Length of Stay:  Expected LOS: 4d 0h  Actual LOS: [facility]

## 2021-12-20 NOTE — PROGRESS NOTES
In CM perspective, pt is ready for discharge. RN made aware. Transition of Care Plan:     RUR: 8% - low risk  Disposition: [facility]  Follow up appointments: at [facility]  DME needed: No needs identified  Transportation at [address] or means to access home:  [facility]      IM Medicare Letter: N/A  Is patient a BCPI-A Bundle: No                  If yes, was Bundle Letter given?: N/A   Is patient a [place] and connected with the [address] [place] [address] Road  If yes, was [place] transfer form completed and VA notified? [facility]  Discharge Caregiver contacted prior to [address] bedside    Initial Note 12:45 pm: Pt will receive his follow up and medical care at [facility]. He will be discharged with deputies. SHERYL 12/20. Unit CM will continue to follow. Care Management Interventions  PCP Verified by CM: Yes  Mode of Transport at Discharge:  Other (see comment) (deputy from care home)  Transition of Care Consult (CM Consult): Discharge Planning  Discharge Durable Medical Equipment: No  Physical Therapy Consult: No  Occupational Therapy Consult: No  Speech Therapy Consult: No  Support Systems: [facility]  Confirm Follow Up Transport: Other (see comment) (Will receive care at care home)  The Plan for Transition of Care is Related to the Following Treatment Goals : [facility]  The Patient and/or Patient Representative was Provided with a Choice of Provider and Agrees with the Discharge Plan?: Yes  Name of the Patient Representative Who was Provided with a Choice of Provider and Agrees with the Discharge Plan: [facility] / pt  Freedom of Choice List was Provided with Basic Dialogue that Supports the Patient's Individualized Plan of Care/Goals, Treatment Preferences and Shares the Quality Data Associated with the Providers?: Yes  Discharge Location  Discharge Placement: Law Enforcement Custody    Viktor Elaine RN, BSN, [address] Manager  [phone]

## 2021-12-20 NOTE — PROGRESS NOTES
Hospitalist Progress Note    NAME: Kenneth Styles   :  1990   MRN:  [id number]       Assessment / Plan:    Right fifth finger open fracture of the distal phalanx with osteomyelitis and necrotic tissue at the distal finger. -X-ray confirms osteomyelitis. MRI:  1. On the prior radiographs, there was a mildly displaced fracture through the  distal phalangeal tuft. The small displaced osseous fragment is not well-seen on  MRI. Abnormal signal in the fifth distal phalanx could be related to trauma  and/or infection. 2. Mild edema in the fifth middle phalanx may be reactive or related to early  osteomyelitis. 3. Diffuse subcutaneous edema in the fifth finger. No evidence of a focal drainable fluid collection. Pain control  S/p Right distal fingertip amputation to the level of the distal portion of the middle phalanx with skin closure, Excisional debridement of skin, subcutaneous tissues, muscle, and bone along with tendon. F/u intra operative Cx   Start empiric vancomycin, cefepime. ID consulted      Abdominal pain, nausea and vomiting  -CT abdomen shows liver hemangiomas but no other acute process  -CMP is normal.  Lipase level is normal.  Troponin is normal.     Heroin abuse  Drug abuse  Drug seeking behavior   -Patient is reporting that he is withdrawing from fentanyl and heroin  -denies alcohol abuse  -Avoid increasing pain meds. Pt requests dilaudid constantly and to increase his IV pain meds, looks very comfortable and sleeping when I enter his room, multiple times.     Hepatitis C  CT and reported Heterogeneous liver attenuation, nonspecific, but can be seen with  hepatitis. Additionally, several wedge-shaped peripheral regions of enhancement  in the liver are noted, which favor areas of shunting versus hemangiomas.   CT liver protocol did not show any liver lesions  Check AFP  HIV neg    Patient is currently incarcerated and cannot call his family. He will need to return to [address] [facility] [address]: From [facility]  and is currently incarcerated  less than 18.5 Underweight / Body mass index is 17.22 kg/m². Code status: Full  Prophylaxis: Lovenox  Recommended Disposition: Incarcerated   Anticipated Discharge Date:  >48 hours        Subjective:     Discussed with RN events overnight. C/o finger pain, with pain meds seeking behavior   C/o back pain, afebrile, no lower extremity numbness/weakness    Review of Systems:  Symptom Y/N Comments  Symptom Y/N Comments   Fever/Chills    Chest Pain     Poor Appetite    Edema     Cough    Abdominal Pain     Sputum    Joint Pain     SOB/ANAYA    Pruritis/Rash     Nausea/vomit    Tolerating PT/OT     Diarrhea    Tolerating Diet     Constipation    Other       PO intake: No data found. Wt Readings from Last 10 Encounters:   12/14/21 54.4 kg (120 lb)       Objective:     VITALS:   Last 24hrs VS reviewed since prior progress note. Most recent are:  Patient Vitals for the past 24 hrs:   Temp Pulse Resp BP SpO2   12/19/21 2030 98.6 °F (37 °C) 60 18 120/78 99 %   12/19/21 1525 98.5 °F (36.9 °C) 76 18 132/79 99 %   12/19/21 1134 98.2 °F (36.8 °C) 70 18 113/61 96 %   12/19/21 0827 98.7 °F (37.1 °C) 86 18 126/81 100 %       Intake/Output Summary (Last 24 hours) at 12/20/2021 5018  Last data filed at 12/19/2021 2249  Gross per 24 hour   Intake 1630 ml   Output 2425 ml   Net -795 ml        I had a face to face encounter, and independently examined this patient on 12/20/2021, as outlined below:    PHYSICAL EXAM:  General:    No distress     HEENT: Atraumatic, anicteric sclerae, pink conjunctivae, MMM  Neck:  Supple, symmetrical  Lungs:   CTA. No Wheezing/Rhonchi. No rales. No tenderness. No Accessory muscle use. Heart:   Regular rhythm. No murmur. No JVD   GI/:   Soft. No tenderness. ND. BS normal  Extremities: No edema. No cyanosis. No clubbing.  R 5th finger covered by dressing   Skin:     Not pale. Not Jaundiced. No rashes   Psych:  Good insight. Not depressed. Not anxious or agitated. Neurologic: Alert and oriented X 4. EOMs intact. No facial asymmetry. No slurred speech. Symmetrical strength, Sensation grossly intact. Labs     I reviewed today's most current labs and imaging studies. Pertinent labs include:  Recent Labs     12/18/21  0115 12/17/21  0435   WBC 12.7* 14.0*   HGB 13.7 15.3   HCT 41.4 46.0   * 577*     Recent Labs     12/19/21  0545 12/18/21  0115 12/17/21  0435   * 138 132*   K 4.0 4.2 4.0   CL 99 107 103   CO2 26 27 23   * 94 112*   BUN 12 18 14   CREA 0.73 0.78 0.82   CA 9.2 8.9 9.3     XR 5TH FINGER RT MIN 2 V    Result Date: 12/14/2021  Findings concerning for acute osteomyelitis involving the tuft of the fifth distal phalanx. CT ABD PELV W CONT    Result Date: 12/14/2021  1. No definite acute abnormality. 2.  Heterogeneous liver attenuation, nonspecific, but can be seen with hepatitis. Additionally, several wedge-shaped peripheral regions of enhancement in the liver are noted, which favor areas of shunting versus hemangiomas. Nonemergent liver mass protocol CT or MRI can be obtained as an outpatient for definitive characterization    CT ABD PELV W WO CONT    Result Date: 12/15/2021  1. Findings within the liver are compatible with transient perfusion abnormality/shunting. A discrete mass lesion is not identified    XR CHEST PORT    Result Date: 12/14/2021  No acute findings. MRI FINGER/HAND JT RT W WO CONT    Result Date: 12/16/2021  1. On the prior radiographs, there was a mildly displaced fracture through the distal phalangeal tuft. The small displaced osseous fragment is not well-seen on MRI. Abnormal signal in the fifth distal phalanx could be related to trauma and/or infection. 2. Mild edema in the fifth middle phalanx may be reactive or related to early osteomyelitis.  3. Diffuse subcutaneous edema in the fifth finger. No evidence of a focal drainable fluid collection. No results found.        Current Medications:     Current Facility-Administered Medications:     vancomycin (VANCOCIN) 1,000 mg in 0.9% sodium chloride 250 mL (VIAL-MATE), 1,000 mg, IntraVENous, Q8H, Americo Graf MD, Last Rate: 250 mL/hr at 12/19/21 2234, 1,000 mg at 12/19/21 2234    heparin (porcine) injection 5,000 Units, 5,000 Units, SubCUTAneous, Q12H, Americo Graf MD, 5,000 Units at 12/19/21 2235    acetaminophen (TYLENOL) tablet 650 mg, 650 mg, Oral, Q6H, Americo Graf MD, 650 mg at 12/19/21 2321    naloxone (NARCAN) injection 0.4 mg, 0.4 mg, IntraVENous, PRN, Eileen Terence, Christian C, DO    ketorolac (TORADOL) injection 30 mg, 30 mg, IntraVENous, Q6H PRN, Christina Graf MD, 30 mg at 12/19/21 1421    lidocaine 4 % patch 1 Patch, 1 Patch, TransDERmal, Q24H, Garcia Christian C, DO, 1 Patch at 12/16/21 1435    gabapentin (NEURONTIN) capsule 100 mg, 100 mg, Oral, BID, Zelaya, Christian C, DO, 100 mg at 12/19/21 1719    oxyCODONE IR (ROXICODONE) tablet 5 mg, 5 mg, Oral, Q6H PRN, Eileen Terence, Christian C, DO, 5 mg at 12/19/21 1724    morphine injection 1 mg, 1 mg, IntraVENous, Q8H PRN, Eileen Terence, Christian C, DO, 1 mg at 12/19/21 2235    promethazine (PHENERGAN) tablet 25 mg, 25 mg, Oral, Q6H PRN, Eileen Terence, Christian C, DO    famotidine (PEPCID) tablet 20 mg, 20 mg, Oral, BID, Zelaya, Christian C, DO, 20 mg at 12/19/21 1719    senna-docusate (PERICOLACE) 8.6-50 mg per tablet 2 Tablet, 2 Tablet, Oral, DAILY, Fidencio Blu C, DO, 2 Tablet at 12/19/21 0838    sodium chloride (NS) flush 5-40 mL, 5-40 mL, IntraVENous, Q8H, Zelaya, Christian C, DO, 10 mL at 12/19/21 2200    sodium chloride (NS) flush 5-40 mL, 5-40 mL, IntraVENous, PRN, Zelaya, Christian C, DO, 10 mL at 12/19/21 1629    acetaminophen (TYLENOL) tablet 650 mg, 650 mg, Oral, Q6H PRN, 650 mg at 12/17/21 1852 **OR** acetaminophen (TYLENOL) suppository 650 mg, 650 mg, Rectal, Q6H PRN, Verdia Snellen, DO    polyethylene glycol (MIRALAX) packet 17 g, 17 g, Oral, DAILY PRN, Dewanda Ast, Christian C, DO    ondansetron (ZOFRAN ODT) tablet 4 mg, 4 mg, Oral, Q8H PRN, 4 mg at 12/18/21 1700 **OR** ondansetron (ZOFRAN) injection 4 mg, 4 mg, IntraVENous, Q6H PRN, Dewanda Ast, Christian C, DO, 4 mg at 12/20/21 0108    cefepime (MAXIPIME) 2 g in 0.9% sodium chloride (MBP/ADV) 100 mL MBP, 2 g, IntraVENous, Q8H, Zelaya, Christian C, DO, Held at 03/39/07 5899    folic acid (FOLVITE) tablet 1 mg, 1 mg, Oral, DAILY, Zelaya, Christian C, DO, 1 mg at 12/19/21 5172    thiamine mononitrate (B-1) tablet 100 mg, 100 mg, Oral, DAILY, Zelaya, Christian C, DO, 100 mg at 12/19/21 0838    [Held by provider] heparin (porcine) injection 5,000 Units, 5,000 Units, SubCUTAneous, Q12H, Aury Tobin MD     Procedures: see electronic medical records for all procedures/Xrays and details which were not copied into this note but were reviewed prior to creation of Plan. Reviewed most current lab test results and cultures  YES  Reviewed most current radiology test results   YES  Review and summation of old records today    NO  Reviewed patient's current orders and MAR    YES  PMH/ reviewed - no change compared to H&P  ________________________________________________________________________  Care Plan discussed with:    Comments   Patient x    Family      RN x    Care Manager     Consultant                        Multidiciplinary team rounds were held today with , nursing, pharmacist and clinical coordinator. Patient's plan of care was discussed; medications were reviewed and discharge planning was addressed.      ________________________________________________________________________  Total NON critical care TIME:   25  Minutes    Total CRITICAL CARE TIME Spent:   Minutes non procedure based      Comments   >50% of visit spent in counseling and coordination of care x     This includes time during multidisciplinary rounds if indicated above ________________________________________________________________________  [place] Hatter, MD

## 2021-12-20 NOTE — PROGRESS NOTES
End of Shift Note    Bedside shift change report given to Soraya Black (oncoming nurse) by Marcellus Greene (offgoing nurse). Report included the following information SBAR, Kardex, Procedure Summary, Intake/Output, MAR and Recent Results    Shift worked:  7a-7p     Shift summary and any significant changes:     Pt medicated for abdominal and finger pain throughout shift. Pt continues to complain of nausea. Medicated for nausea prn. Abdominal x ray completed. Pt reports he did have several BMs this afternoon. Voiding using urinal. Guards remain at bedside. Pt in one ankle shackle. Concerns for physician to address:  nausea      Zone phone for oncoming shift:   [phone]       Activity:  Activity Level: Up with Assistance  Number times ambulated in hallways past shift: 0  Number of times OOB to chair past shift: 0    Cardiac:   Cardiac Monitoring: No      Cardiac Rhythm: Sinus Rhythm    Access:   Current line(s): PIV     Genitourinary:   Urinary status: voiding    Respiratory:   O2 Device: None (Room air)  Chronic home O2 use?: NO  Incentive spirometer at bedside: N/A     GI:  Last Bowel Movement Date: 12/20/21  Current diet:  DIET ONE TIME MESSAGE  ADULT ORAL NUTRITION SUPPLEMENT Breakfast, Lunch, Dinner; Standard High Calorie/High Protein  ADULT DIET Regular  DIET ONE TIME MESSAGE  DIET ONE TIME MESSAGE  Passing flatus: YES  Tolerating current diet: NO       Pain Management:   Patient states pain is manageable on current regimen: YES    Skin:  Bill Score: 21  Interventions: increase time out of bed    Patient Safety:  Fall Score:  Total Score: 1  Interventions: gripper socks  High Fall Risk: Yes    Length of Stay:  Expected LOS: 4d 0h  Actual LOS: [facility]

## 2021-12-21 LAB
ANION GAP SERPL CALC-SCNC: 4 MMOL/L (ref 5–15)
BACTERIA SPEC CULT: NORMAL
BUN SERPL-MCNC: 14 MG/DL (ref 6–20)
BUN/CREAT SERPL: 16 (ref 12–20)
CALCIUM SERPL-MCNC: 9.6 MG/DL (ref 8.5–10.1)
CHLORIDE SERPL-SCNC: 100 MMOL/L (ref 97–108)
CO2 SERPL-SCNC: 30 MMOL/L (ref 21–32)
CREAT SERPL-MCNC: 0.87 MG/DL (ref 0.7–1.3)
GLUCOSE SERPL-MCNC: 101 MG/DL (ref 65–100)
POTASSIUM SERPL-SCNC: 4.5 MMOL/L (ref 3.5–5.1)
SERVICE CMNT-IMP: NORMAL
SODIUM SERPL-SCNC: 134 MMOL/L (ref 136–145)

## 2021-12-21 PROCEDURE — 74011250637 HC RX REV CODE- 250/637: Performed by: ORTHOPAEDIC SURGERY

## 2021-12-21 PROCEDURE — 65660000000 HC RM CCU STEPDOWN

## 2021-12-21 PROCEDURE — 80048 BASIC METABOLIC PNL TOTAL CA: CPT

## 2021-12-21 PROCEDURE — 74011250636 HC RX REV CODE- 250/636: Performed by: GENERAL ACUTE CARE HOSPITAL

## 2021-12-21 PROCEDURE — 74011000258 HC RX REV CODE- 258: Performed by: ORTHOPAEDIC SURGERY

## 2021-12-21 PROCEDURE — 2709999900 HC NON-CHARGEABLE SUPPLY

## 2021-12-21 PROCEDURE — 74011250636 HC RX REV CODE- 250/636: Performed by: ORTHOPAEDIC SURGERY

## 2021-12-21 PROCEDURE — 36415 COLL VENOUS BLD VENIPUNCTURE: CPT

## 2021-12-21 PROCEDURE — 74011250637 HC RX REV CODE- 250/637: Performed by: INTERNAL MEDICINE

## 2021-12-21 PROCEDURE — 74011000250 HC RX REV CODE- 250: Performed by: ORTHOPAEDIC SURGERY

## 2021-12-21 PROCEDURE — 74011250637 HC RX REV CODE- 250/637: Performed by: GENERAL ACUTE CARE HOSPITAL

## 2021-12-21 PROCEDURE — 94760 N-INVAS EAR/PLS OXIMETRY 1: CPT

## 2021-12-21 RX ORDER — ALPRAZOLAM 0.5 MG/1
0.5 TABLET ORAL
Status: DISCONTINUED | OUTPATIENT
Start: 2021-12-21 | End: 2021-12-22 | Stop reason: HOSPADM

## 2021-12-21 RX ADMIN — GABAPENTIN 100 MG: 100 CAPSULE ORAL at 08:27

## 2021-12-21 RX ADMIN — OXYCODONE 5 MG: 5 TABLET ORAL at 05:34

## 2021-12-21 RX ADMIN — FOLIC ACID 1 MG: 1 TABLET ORAL at 08:27

## 2021-12-21 RX ADMIN — METOCLOPRAMIDE 5 MG: 5 INJECTION, SOLUTION INTRAMUSCULAR; INTRAVENOUS at 05:37

## 2021-12-21 RX ADMIN — VANCOMYCIN HYDROCHLORIDE 1000 MG: 1 INJECTION, POWDER, LYOPHILIZED, FOR SOLUTION INTRAVENOUS at 06:32

## 2021-12-21 RX ADMIN — Medication 10 ML: at 13:28

## 2021-12-21 RX ADMIN — DOCUSATE SODIUM 50MG AND SENNOSIDES 8.6MG 2 TABLET: 8.6; 5 TABLET, FILM COATED ORAL at 08:27

## 2021-12-21 RX ADMIN — Medication 1 CAPSULE: at 08:27

## 2021-12-21 RX ADMIN — PANTOPRAZOLE SODIUM 40 MG: 40 TABLET, DELAYED RELEASE ORAL at 17:58

## 2021-12-21 RX ADMIN — MORPHINE SULFATE 1 MG: 2 INJECTION, SOLUTION INTRAMUSCULAR; INTRAVENOUS at 02:33

## 2021-12-21 RX ADMIN — CEFEPIME HYDROCHLORIDE 2 G: 2 INJECTION, POWDER, FOR SOLUTION INTRAVENOUS at 17:58

## 2021-12-21 RX ADMIN — ACETAMINOPHEN 650 MG: 325 TABLET ORAL at 05:34

## 2021-12-21 RX ADMIN — Medication 100 MG: at 08:27

## 2021-12-21 RX ADMIN — ACETAMINOPHEN 650 MG: 325 TABLET ORAL at 13:27

## 2021-12-21 RX ADMIN — Medication 10 ML: at 06:32

## 2021-12-21 RX ADMIN — PANTOPRAZOLE SODIUM 40 MG: 40 TABLET, DELAYED RELEASE ORAL at 08:27

## 2021-12-21 RX ADMIN — Medication 10 ML: at 22:04

## 2021-12-21 RX ADMIN — POLYETHYLENE GLYCOL 3350 17 G: 17 POWDER, FOR SOLUTION ORAL at 08:27

## 2021-12-21 RX ADMIN — KETOROLAC TROMETHAMINE 30 MG: 30 INJECTION, SOLUTION INTRAMUSCULAR; INTRAVENOUS at 08:28

## 2021-12-21 RX ADMIN — GABAPENTIN 100 MG: 100 CAPSULE ORAL at 17:58

## 2021-12-21 RX ADMIN — METOCLOPRAMIDE 5 MG: 5 INJECTION, SOLUTION INTRAMUSCULAR; INTRAVENOUS at 00:00

## 2021-12-21 RX ADMIN — METOCLOPRAMIDE 5 MG: 5 INJECTION, SOLUTION INTRAMUSCULAR; INTRAVENOUS at 13:27

## 2021-12-21 RX ADMIN — METOCLOPRAMIDE 5 MG: 5 INJECTION, SOLUTION INTRAMUSCULAR; INTRAVENOUS at 17:58

## 2021-12-21 RX ADMIN — MUPIROCIN: 20 OINTMENT TOPICAL at 08:28

## 2021-12-21 RX ADMIN — VANCOMYCIN HYDROCHLORIDE 1000 MG: 1 INJECTION, POWDER, LYOPHILIZED, FOR SOLUTION INTRAVENOUS at 22:03

## 2021-12-21 RX ADMIN — HEPARIN SODIUM 5000 UNITS: 5000 INJECTION INTRAVENOUS; SUBCUTANEOUS at 08:28

## 2021-12-21 RX ADMIN — VANCOMYCIN HYDROCHLORIDE 1000 MG: 1 INJECTION, POWDER, LYOPHILIZED, FOR SOLUTION INTRAVENOUS at 15:30

## 2021-12-21 RX ADMIN — CEFEPIME HYDROCHLORIDE 2 G: 2 INJECTION, POWDER, FOR SOLUTION INTRAVENOUS at 10:13

## 2021-12-21 RX ADMIN — PROMETHAZINE HYDROCHLORIDE 25 MG: 25 TABLET ORAL at 08:27

## 2021-12-21 RX ADMIN — ACETAMINOPHEN 650 MG: 325 TABLET ORAL at 17:58

## 2021-12-21 RX ADMIN — MUPIROCIN: 20 OINTMENT TOPICAL at 21:00

## 2021-12-21 NOTE — PROGRESS NOTES
End of Shift Note    Bedside shift change report given to Watson Aquino  (oncoming nurse) by Remington Martinez (offgoing nurse). Report included the following information SBAR, Kardex, Procedure Summary, Intake/Output, MAR and Recent Results    Shift worked:  7a-7p     Shift summary and any significant changes:     Pt medicated for abdominal and nausea. Pain and nausea seem to be improved from yesterday. Pt took a shower after dressing and IV were covered. Voiding using urinal. Guards remain at bedside. Pt in one ankle shackle. No BM this shift. Concerns for physician to address:  nausea      Zone phone for oncoming shift:   [phone]       Activity:  Activity Level: Up ad paige,Up with Assistance  Number times ambulated in hallways past shift: 0  Number of times OOB to chair past shift: 0    Cardiac:   Cardiac Monitoring: No      Cardiac Rhythm: Sinus Rhythm    Access:   Current line(s): PIV     Genitourinary:   Urinary status: voiding    Respiratory:   O2 Device: None (Room air)  Chronic home O2 use?: NO  Incentive spirometer at bedside: N/A     GI:  Last Bowel Movement Date: 12/20/21  Current diet:  DIET ONE TIME MESSAGE  ADULT ORAL NUTRITION SUPPLEMENT Breakfast, Lunch, Dinner; Standard High Calorie/High Protein  ADULT DIET Regular  DIET ONE TIME MESSAGE  DIET ONE TIME MESSAGE  DIET ONE TIME MESSAGE  Passing flatus: YES  Tolerating current diet: NO       Pain Management:   Patient states pain is manageable on current regimen: YES    Skin:  Bill Score: 21  Interventions: increase time out of bed    Patient Safety:  Fall Score:  Total Score: 1  Interventions: gripper socks  High Fall Risk: Yes    Length of Stay:  Expected LOS: 4d 0h  Actual LOS: [address]

## 2021-12-21 NOTE — PROGRESS NOTES
Transition of Care Plan:     RUR: 9% - low risk  Disposition: [facility]  Follow up appointments: at [facility]  DME needed: No needs identified  Transportation at [address] or means to access home:  [place] [facility] Medicare Letter: N/A  Is patient a BCPI-A Bundle: No                  If yes, was Bundle Letter given?: N/A   Is patient a  and connected with the [address] [place] [address] Road  If yes, was [employer] transfer form completed and [facility] notified? [address]  Discharge Caregiver contacted prior to discharge?   At bedside     Initial Note 12:45 pm: Pt will receive his follow up and medical care at [facility]. He will be discharged with deputies. SHERYL 12/22. Unit CM will continue to follow.      Karen Chambers RN, BSN, [address] [place] [address] Care Manager  [phone]

## 2021-12-21 NOTE — PROGRESS NOTES
Comprehensive Nutrition Assessment    Type and Reason for Visit: Reassess    Nutrition Recommendations/Plan:   Continue diet and supplements as tolerated     Nutrition Assessment:   Chart reviewed remotely. Pt with intermittent n/v per notes. Otherwise PO intake looks good. Labs reviewed, WNL. BM noted yesterday. Discharge planning is underway. Patient Vitals for the past 168 hrs:   % Diet Eaten   12/21/21 1409 76 - 100%   12/20/21 1749 76 - 100%   12/20/21 1534 1 - 25%   12/20/21 0839 0%   12/19/21 0912 76 - 100%   12/18/21 1230 76 - 100%   12/16/21 1832 76 - 100%   12/16/21 1400 76 - 100%   12/16/21 0751 0%   12/15/21 1752 76 - 100%   12/15/21 1135 0%   12/15/21 0758 0%       Malnutrition Assessment:  Malnutrition Status:  Severe malnutrition    Context:  Social/environmental circumstances     Findings of the 6 clinical characteristics of malnutrition:   Energy Intake:  1 - Less than 75% est energy requirements for 3 months or longer  Weight Loss:  Mild weight loass (specify amount and time period)     Body Fat Loss:  7 - Severe body fat loss, Fat overlying ribs   Muscle Mass Loss:  7 - Severe muscle mass loss, Temples (temporalis),Clavicles (pectoralis &deltoids)  Fluid Accumulation:  Unable to assess,     Strength:  Not performed       Estimated Daily Nutrient Needs:  Energy (kcal): MSJ 2000 (1515 x 1.3); Weight Used for Energy Requirements: Current  Protein (g): 65-76g (1.2-1.4gPro/kg); Weight Used for Protein Requirements: Current  Fluid (ml/day): 2000mL; Method Used for Fluid Requirements: 1 ml/kcal      Nutrition Related Findings:  Meds: cefepime, folvite, risaquad, reglan, protonix, miralax, pericolace, vancomycin, thaimin.   BM 12/20      Wounds:    Surgical incision       Current Nutrition Therapies:  ADULT ORAL NUTRITION SUPPLEMENT Breakfast, Lunch, Dinner; Standard High Calorie/High Protein  ADULT DIET Regular    Anthropometric Measures:  · Height:  5' 10\" (177.8 cm)  · Current Body Wt: 54.4 kg (119 lb 14.9 oz)   · Usual Body Wt:   (UTD)     · Ideal Body Wt:  166 lbs:  72.2 %   · BMI Category:  Underweight (BMI less than 18.5)       Nutrition Diagnosis:   · Severe malnutrition related to other (specify),inadequate protein-energy intake (drug abuse) as evidenced by weight loss,severe muscle loss,severe loss of subcutaneous fat  Previous dx continues. Nutrition Interventions:   Food and/or Nutrient Delivery: Continue current diet,Continue oral nutrition supplement  Nutrition Education and Counseling: No recommendations at this time  Coordination of Nutrition Care: Continue to monitor while inpatient    Goals:  Pt will consume >70% of meals/supplements in 4-6 days.        Nutrition Monitoring and Evaluation:   Behavioral-Environmental Outcomes: None identified  Food/Nutrient Intake Outcomes: Food and nutrient intake,Supplement intake  Physical Signs/Symptoms Outcomes: Biochemical data,Skin,Weight    Discharge Planning:    Continue oral nutrition supplement,Continue current diet     Electronically signed by Eun Walton RD, [address]  on 12/21/2021 at 2:39 PM    Contact: [phone]

## 2021-12-21 NOTE — DISCHARGE INSTRUCTIONS
Post op Discharge Instructions Hip Fracture   Dereck Ruiz, [facility]  [phone]    Patient Name: Yoanna Lopez  Date of admission:  12/14/2021  Date of procedure: 12/17/2021   Procedure: Procedure(s):  RIGHT FIFTH DIGIT INCISION AND DRAINAGE, PARTIAL AMPUTATION  PCP: None  Date of discharge: No discharge date for patient encounter. Follow up office visit   See Dr. Brenda Junior approximately 2 weeks from date of surgery. Call [phone] to make an appointment. 1. Please maintain the dressing until your follow up appointment where it will be removed. Please keep the dressing clean and dry. 2. Please elevate the operative extremity to the level of the heart to keep swelling at a minimum. You may get up to move around, but when seated please keep the extremity elevated as much as possible. This will decrease swelling and pain. 3. Do not bear weight with the operative extremity. Please do not lift anything with the operative finger. 4. You may ice your Arm/Hand 5 times a day for 20 minutes at a time. 5. A prescription for pain medication is provided. The key to pain control is staying ahead of the pain. For the first 48-72 hours after your surgery, you may want to take your pain medication on a regular schedule. After that, you may only need it on an as needed basis. 6. If you experience any increased redness, increased pain, increased swelling not relieved by elevation, drainage, fever, or chills, please return to hospital, or call office.

## 2021-12-21 NOTE — PROGRESS NOTES
Orthopedic Progress Note    S: Pain controlled, improved; no new complaints; Denies numbness, tingling, focal weakness distal to surgical site, increased swelling/redness/pain, drainage, cp, sob, fever, chills    O: NAD, respirations unlabored; Dressings CDI; incision with some mild surrounding erythema as expected with healing, edges aligned; finger mobile, minimal swelling, nonttp. Patient Vitals for the past 4 hrs:   Temp Pulse BP   12/21/21 1112 97.6 °F (36.4 °C) 78 118/64   12/21/21 0748 98.7 °F (37.1 °C) (!) 58 134/83     Recent Labs     12/21/21  0240 12/20/21  0355   HGB  --  13.9   HCT  --  42.2   PLT  --  443*   BUN 14 12   CREA 0.87 0.79   K 4.5 4.3   * 137       XR ABD (KUB)  Narrative: Clinical indication: Nausea vomiting. KUB obtained, moderate fecal stasis. Nonspecific otherwise. Impression: impression: Moderate fecal stasis. A/P:  Procedure: Procedure(s):  RIGHT FIFTH DIGIT INCISION AND DRAINAGE, PARTIAL AMPUTATION  Post Op day: 4 Days Post-Op    - Abx plan per ID  - Pain - Acetaminophen; Ice, Elevation, Ace wrap as needed  - Dressing - Leave in place if it remains dry and intact; change as needed for saturation with dry sterile island dressing; sutures out at 14 days. - Dispo - orthopaedically stable for discharge; needs f/u in 10-14 days with Dr. Haja Hayden. Will sign off, call with questions. See DC instructions.      DEMARCO Calabrese  Orthopedic Trauma Service  [address] [place] [address]

## 2021-12-21 NOTE — PROGRESS NOTES
Hospitalist Progress Note    NAME: Jasen Adhikari   :  1990   MRN:  [id number]       Assessment / Plan:    Right fifth finger open fracture of the distal phalanx with osteomyelitis and necrotic tissue at the distal finger. MRSA wound infection   -X-ray confirms osteomyelitis. MRI: 1. On the prior radiographs, there was a mildly displaced fracture through the  distal phalangeal tuft. The small displaced osseous fragment is not well-seen on  MRI. Abnormal signal in the fifth distal phalanx could be related to trauma  and/or infection. 2. Mild edema in the fifth middle phalanx may be reactive or related to early osteomyelitis. 3. Diffuse subcutaneous edema in the fifth finger. No evidence of a focal drainable fluid collection. Pain control  S/p Right distal fingertip amputation to the level of the distal portion of the middle phalanx with skin closure, Excisional debridement of skin, subcutaneous tissues, muscle, and bone along with tendon. Intra operative Cx grew MRSA  Continue empiric Vancomycin and Cefepime IV until (7 days), thereafter changed to p.o. antibiotics-doxycycline for 2 more weeks. Probiotics   F/u with Dr Dwana Ruiz in 10-14 days   DC in AM if no GI issues      Abdominal pain, nausea and vomiting  -CT abdomen shows liver hemangiomas but no other acute process  -CMP is normal.  Lipase level is normal.  Troponin is normal.     Heroin abuse  Drug abuse  Drug seeking behavior   -Patient is reporting that he is withdrawing from fentanyl and heroin  -denies alcohol abuse  -Avoid increasing pain meds. Pt requests dilaudid constantly and to increase his IV pain meds, looks very comfortable and sleeping when I enter his room, multiple times.     Hepatitis C  CT and reported Heterogeneous liver attenuation, nonspecific, but can be seen with  hepatitis.  Additionally, several wedge-shaped peripheral regions of enhancement  in the liver are noted, which favor areas of shunting versus hemangiomas. CT liver protocol did not show any liver lesions  AFP neg  HIV neg  Hepatology f/u as outpt     N/V  Fecal stasis  Start miralax and senna  Trial of Reglan     Patient is currently incarcerated and cannot call his family. He will need to return to [facility] [address]: From [facility]  and is currently incarcerated  less than 18.5 Underweight / Body mass index is 17.22 kg/m². Code status: Full  Prophylaxis: Lovenox  Recommended Disposition: Incarcerated   Anticipated Discharge Date:  24 hours        Subjective:     Discussed with RN events overnight. C/o finger pain, with pain meds seeking behavior   C/o back pain, afebrile, no lower extremity numbness/weakness  N/V yesterday   Prefers to have only clears today     Review of Systems:  Symptom Y/N Comments  Symptom Y/N Comments   Fever/Chills    Chest Pain     Poor Appetite    Edema     Cough    Abdominal Pain     Sputum    Joint Pain     SOB/ANAYA    Pruritis/Rash     Nausea/vomit    Tolerating PT/OT     Diarrhea    Tolerating Diet     Constipation    Other       PO intake: No data found. Wt Readings from Last 10 Encounters:   12/14/21 54.4 kg (120 lb)       Objective:     VITALS:   Last 24hrs VS reviewed since prior progress note.  Most recent are:  Patient Vitals for the past 24 hrs:   Temp Pulse Resp BP SpO2   12/21/21 1445 97.9 °F (36.6 °C) 72 16 (!) 145/89 100 %   12/21/21 1112 97.6 °F (36.4 °C) 78 17 118/64 98 %   12/21/21 0748 98.7 °F (37.1 °C) (!) 58 17 134/83 98 %   12/21/21 0256 98.3 °F (36.8 °C) (!) 57 18 (!) 140/86 99 %   12/20/21 1959 98.5 °F (36.9 °C) 68 18 125/73 99 %       Intake/Output Summary (Last 24 hours) at 12/21/2021 1556  Last data filed at 12/21/2021 1409  Gross per 24 hour   Intake 1460 ml   Output 2900 ml   Net -1440 ml        I had a face to face encounter, and independently examined this patient on 12/21/2021, as outlined below:    PHYSICAL EXAM:  General:    No distress     HEENT: Atraumatic, anicteric sclerae, pink conjunctivae, MMM  Neck:  Supple, symmetrical  Lungs:   CTA. No Wheezing/Rhonchi. No rales. No tenderness. No Accessory muscle use. Heart:   Regular rhythm. No murmur. No JVD   GI/:   Soft. No tenderness. ND. BS normal  Extremities: No edema. No cyanosis. No clubbing. R 5th finger covered by dressing   Skin:     Not pale. Not Jaundiced. No rashes   Psych:  Good insight. Not depressed. Not anxious or agitated. Neurologic: Alert and oriented X 4. EOMs intact. No facial asymmetry. No slurred speech. Symmetrical strength, Sensation grossly intact. Labs     I reviewed today's most current labs and imaging studies. Pertinent labs include:  Recent Labs     12/20/21  0355   WBC 11.3*   HGB 13.9   HCT 42.2   *     Recent Labs     12/21/21  0240 12/20/21  0355 12/19/21  0545   * 137 132*   K 4.5 4.3 4.0    100 99   CO2 30 31 26   * 107* 115*   BUN 14 12 12   CREA 0.87 0.79 0.73   CA 9.6 9.4 9.2     XR 5TH FINGER RT MIN 2 V    Result Date: 12/14/2021  Findings concerning for acute osteomyelitis involving the tuft of the fifth distal phalanx. XR ABD (KUB)    Result Date: 12/20/2021  impression: Moderate fecal stasis. CT ABD PELV W CONT    Result Date: 12/14/2021  1. No definite acute abnormality. 2.  Heterogeneous liver attenuation, nonspecific, but can be seen with hepatitis. Additionally, several wedge-shaped peripheral regions of enhancement in the liver are noted, which favor areas of shunting versus hemangiomas. Nonemergent liver mass protocol CT or MRI can be obtained as an outpatient for definitive characterization    CT ABD PELV W WO CONT    Result Date: 12/15/2021  1. Findings within the liver are compatible with transient perfusion abnormality/shunting. A discrete mass lesion is not identified    XR CHEST PORT    Result Date: 12/14/2021  No acute findings.      MRI FINGER/HAND JT RT W WO CONT    Result Date: 12/16/2021  1. On the prior radiographs, there was a mildly displaced fracture through the distal phalangeal tuft. The small displaced osseous fragment is not well-seen on MRI. Abnormal signal in the fifth distal phalanx could be related to trauma and/or infection. 2. Mild edema in the fifth middle phalanx may be reactive or related to early osteomyelitis. 3. Diffuse subcutaneous edema in the fifth finger. No evidence of a focal drainable fluid collection. No results found.        Current Medications:     Current Facility-Administered Medications:     pantoprazole (PROTONIX) tablet 40 mg, 40 mg, Oral, ACB&D, Christina Graf MD, 40 mg at 12/21/21 0827    metoclopramide HCl (REGLAN) injection 5 mg, 5 mg, IntraVENous, Q6H, Christina Graf MD, 5 mg at 12/21/21 1327    polyethylene glycol (MIRALAX) packet 17 g, 17 g, Oral, DAILY, Christina Graf MD, 17 g at 12/21/21 0827    mupirocin (BACTROBAN) 2 % ointment, , Topical, BID, Kulwinder Mccoy MD, Given at 12/21/21 0828    L.acidophilus-paracasei-S.thermophil-bifidobacter (RISAQUAD) 8 billion cell capsule, 1 Capsule, Oral, DAILY, Kulwinder Mccoy MD, 1 Capsule at 12/21/21 0827    vancomycin (VANCOCIN) 1,000 mg in 0.9% sodium chloride 250 mL (VIAL-MATE), 1,000 mg, IntraVENous, Q8H, Christina Graf MD, Last Rate: 250 mL/hr at 12/21/21 1530, 1,000 mg at 12/21/21 1530    heparin (porcine) injection 5,000 Units, 5,000 Units, SubCUTAneous, Q12H, Christina Graf MD, 5,000 Units at 12/21/21 0828    acetaminophen (TYLENOL) tablet 650 mg, 650 mg, Oral, Q6H, General Clemente Graf MD, 650 mg at 12/21/21 1327    naloxone (NARCAN) injection 0.4 mg, 0.4 mg, IntraVENous, PRN, Christian Bejarano DO    ketorolac (TORADOL) injection 30 mg, 30 mg, IntraVENous, Q6H PRN, Christina Graf MD, 30 mg at 12/21/21 0828    lidocaine 4 % patch 1 Patch, 1 Patch, TransDERmal, Q24H, Christian Zelaya DO, 1 Patch at 12/20/21 1331    gabapentin (NEURONTIN) capsule 100 mg, 100 mg, Oral, BID, Zelaya, Christian C, DO, 100 mg at 12/21/21 0827    oxyCODONE IR (ROXICODONE) tablet 5 mg, 5 mg, Oral, Q6H PRN, Leonora Simpler, Christian C, DO, 5 mg at 12/21/21 0534    morphine injection 1 mg, 1 mg, IntraVENous, Q8H PRN, Leonora Simpler, Christian C, DO, 1 mg at 12/21/21 2432    promethazine (PHENERGAN) tablet 25 mg, 25 mg, Oral, Q6H PRN, Leonora Simpler, Christian C, DO, 25 mg at 12/21/21 0827    [Held by provider] famotidine (PEPCID) tablet 20 mg, 20 mg, Oral, BID, Zelaya, Christian C, DO, 20 mg at 12/20/21 3714    senna-docusate (PERICOLACE) 8.6-50 mg per tablet 2 Tablet, 2 Tablet, Oral, DAILY, Ruma Balder C, DO, 2 Tablet at 12/21/21 0827    sodium chloride (NS) flush 5-40 mL, 5-40 mL, IntraVENous, Q8H, Zelaya, Christian C, DO, 10 mL at 12/21/21 1328    sodium chloride (NS) flush 5-40 mL, 5-40 mL, IntraVENous, PRN, Leonora Simpler, Christian C, DO, 10 mL at 12/19/21 1629    acetaminophen (TYLENOL) tablet 650 mg, 650 mg, Oral, Q6H PRN, 650 mg at 12/17/21 1852 **OR** acetaminophen (TYLENOL) suppository 650 mg, 650 mg, Rectal, Q6H PRN, Leonora Simpler, Christian C, DO    polyethylene glycol (MIRALAX) packet 17 g, 17 g, Oral, DAILY PRN, Leonora Simpler, Christian C, DO    ondansetron (ZOFRAN ODT) tablet 4 mg, 4 mg, Oral, Q8H PRN, 4 mg at 12/18/21 1700 **OR** ondansetron (ZOFRAN) injection 4 mg, 4 mg, IntraVENous, Q6H PRN, Leonora Simpler, Christian C, DO, 4 mg at 12/20/21 2143    cefepime (MAXIPIME) 2 g in 0.9% sodium chloride (MBP/ADV) 100 mL MBP, 2 g, IntraVENous, Q8H, Zelaya, Christian C, DO, Last Rate: 200 mL/hr at 12/21/21 1013, 2 g at 87/91/45 8895    folic acid (FOLVITE) tablet 1 mg, 1 mg, Oral, DAILY, Zelaya, Christian C, DO, 1 mg at 12/21/21 0827    thiamine mononitrate (B-1) tablet 100 mg, 100 mg, Oral, DAILY, Zelaya, Christian C, DO, 100 mg at 12/21/21 4665     Procedures: see electronic medical records for all procedures/Xrays and details which were not copied into this note but were reviewed prior to creation of Plan.     Reviewed most current lab test results and cultures  YES  Reviewed most current radiology test results   YES  Review and summation of old records today    NO  Reviewed patient's current orders and MAR    YES  PMH/SH reviewed - no change compared to H&P  ________________________________________________________________________  Care Plan discussed with:    Comments   Patient x    Family      RN     Care Manager     Consultant                        Multidiciplinary team rounds were held today with , nursing, pharmacist and clinical coordinator. Patient's plan of care was discussed; medications were reviewed and discharge planning was addressed.      ________________________________________________________________________  Total NON critical care TIME:   25  Minutes    Total CRITICAL CARE TIME Spent:   Minutes non procedure based      Comments   >50% of visit spent in counseling and coordination of care x     This includes time during multidisciplinary rounds if indicated above   ________________________________________________________________________  Isiah Sun MD

## 2021-12-21 NOTE — PROGRESS NOTES
End of Shift Note    Bedside shift change report given to Gina Mora RN (oncoming nurse) by Haritha Araya RN (offgoing nurse). Report included the following information SBAR, Kardex, OR Summary, Intake/Output, MAR and Recent Results    Shift worked:  9430-1843     Shift summary and any significant changes:     Verbally abusive to officers and staff accusing staff of not giving him his due pain meds. Vomited undigested food x2. No BM last night. Concerns for physician to address:       Zone phone for oncoming shift:   [phone]       Activity:  Activity Level: Up with Assistance  Number times ambulated in hallways past shift: 0  Number of times OOB to chair past shift: 0    Cardiac:   Cardiac Monitoring: No      Cardiac Rhythm: Sinus Rhythm    Access:   Current line(s): PIV     Genitourinary:   Urinary status: voiding    Respiratory:   O2 Device: None (Room air)  Chronic home O2 use?: NO  Incentive spirometer at bedside: YES     GI:  Last Bowel Movement Date: 12/20/21  Current diet:  DIET ONE TIME MESSAGE  ADULT ORAL NUTRITION SUPPLEMENT Breakfast, Lunch, Dinner; Standard High Calorie/High Protein  ADULT DIET Regular  DIET ONE TIME MESSAGE  DIET ONE TIME MESSAGE  Passing flatus: NO  Tolerating current diet: YES       Pain Management:   Patient states pain is manageable on current regimen: NO    Skin:  Bill Score: 22  Interventions: float heels, increase time out of bed and nutritional support     Patient Safety:  Fall Score:  Total Score: 1  Interventions: assistive device (walker, cane, etc), gripper socks, pt to call before getting OOB and stay with me (per policy)  High Fall Risk: Yes    Length of Stay:  Expected LOS: 4d 0h  Actual LOS: Nkechi Mcintyre, RN

## 2021-12-21 NOTE — PROGRESS NOTES
Hospitalist Progress Note    NAME: Vesta Jiménez   :  1990   MRN:  [id number]       Assessment / Plan:    Right fifth finger open fracture of the distal phalanx with osteomyelitis and necrotic tissue at the distal finger. -X-ray confirms osteomyelitis. MRI:  1. On the prior radiographs, there was a mildly displaced fracture through the  distal phalangeal tuft. The small displaced osseous fragment is not well-seen on  MRI. Abnormal signal in the fifth distal phalanx could be related to trauma  and/or infection. 2. Mild edema in the fifth middle phalanx may be reactive or related to early  osteomyelitis. 3. Diffuse subcutaneous edema in the fifth finger. No evidence of a focal drainable fluid collection. Pain control  S/p Right distal fingertip amputation to the level of the distal portion of the middle phalanx with skin closure, Excisional debridement of skin, subcutaneous tissues, muscle, and bone along with tendon. F/u intra operative Cx   Continue empiric Vancomycin and Cefepime IV until (7 days), thereafter changed to p.o. antibiotics-Keflex doxycycline for 2 more weeks. Probiotics      Abdominal pain, nausea and vomiting  -CT abdomen shows liver hemangiomas but no other acute process  -CMP is normal.  Lipase level is normal.  Troponin is normal.     Heroin abuse  Drug abuse  Drug seeking behavior   -Patient is reporting that he is withdrawing from fentanyl and heroin  -denies alcohol abuse  -Avoid increasing pain meds. Pt requests dilaudid constantly and to increase his IV pain meds, looks very comfortable and sleeping when I enter his room, multiple times.     Hepatitis C  CT and reported Heterogeneous liver attenuation, nonspecific, but can be seen with  hepatitis.  Additionally, several wedge-shaped peripheral regions of enhancement  in the liver are noted, which favor areas of shunting versus hemangiomas. CT liver protocol did not show any liver lesions  Check AFP  HIV neg    N/V  Fecal stasis  Start miralax and senna  Trial of Reglan     Patient is currently incarcerated and cannot call his family. He will need to return to [address] [facility] [address]: From [facility], [facility]  and is currently incarcerated  less than 18.5 Underweight / Body mass index is 17.22 kg/m². Code status: Full  Prophylaxis: Lovenox  Recommended Disposition: Incarcerated   Anticipated Discharge Date:  24-48 hours        Subjective:     Discussed with RN events overnight. C/o finger pain, with pain meds seeking behavior   C/o back pain, afebrile, no lower extremity numbness/weakness  N/V today     Review of Systems:  Symptom Y/N Comments  Symptom Y/N Comments   Fever/Chills    Chest Pain     Poor Appetite    Edema     Cough    Abdominal Pain     Sputum    Joint Pain     SOB/ANAYA    Pruritis/Rash     Nausea/vomit    Tolerating PT/OT     Diarrhea    Tolerating Diet     Constipation    Other       PO intake: No data found. Wt Readings from Last 10 Encounters:   12/14/21 54.4 kg (120 lb)       Objective:     VITALS:   Last 24hrs VS reviewed since prior progress note.  Most recent are:  Patient Vitals for the past 24 hrs:   Temp Pulse Resp BP SpO2   12/21/21 0256 98.3 °F (36.8 °C) (!) 57 18 (!) 140/86 99 %   12/20/21 1959 98.5 °F (36.9 °C) 68 18 125/73 99 %   12/20/21 1503 97.9 °F (36.6 °C) 63 18 (!) 167/98 100 %   12/20/21 1107 98.3 °F (36.8 °C) 60 18 (!) 157/90 100 %   12/20/21 0818 98.7 °F (37.1 °C) 61 17 139/85 100 %   12/20/21 0355 98.1 °F (36.7 °C) (!) 58 18 128/81 99 %       Intake/Output Summary (Last 24 hours) at 12/21/2021 0340  Last data filed at 12/21/2021 1932  Gross per 24 hour   Intake 580 ml   Output 3950 ml   Net -3370 ml        I had a face to face encounter, and independently examined this patient on 12/21/2021, as outlined below:    PHYSICAL EXAM:  General:    No distress     HEENT: Atraumatic, anicteric sclerae, pink conjunctivae, MMM  Neck:  Supple, symmetrical  Lungs:   CTA. No Wheezing/Rhonchi. No rales. No tenderness. No Accessory muscle use. Heart:   Regular rhythm. No murmur. No JVD   GI/:   Soft. No tenderness. ND. BS normal  Extremities: No edema. No cyanosis. No clubbing. R 5th finger covered by dressing   Skin:     Not pale. Not Jaundiced. No rashes   Psych:  Good insight. Not depressed. Not anxious or agitated. Neurologic: Alert and oriented X 4. EOMs intact. No facial asymmetry. No slurred speech. Symmetrical strength, Sensation grossly intact. Labs     I reviewed today's most current labs and imaging studies. Pertinent labs include:  Recent Labs     12/20/21  0355   WBC 11.3*   HGB 13.9   HCT 42.2   *     Recent Labs     12/20/21  0355 12/19/21  0545    132*   K 4.3 4.0    99   CO2 31 26   * 115*   BUN 12 12   CREA 0.79 0.73   CA 9.4 9.2     XR 5TH FINGER RT MIN 2 V    Result Date: 12/14/2021  Findings concerning for acute osteomyelitis involving the tuft of the fifth distal phalanx. XR ABD (KUB)    Result Date: 12/20/2021  impression: Moderate fecal stasis. CT ABD PELV W CONT    Result Date: 12/14/2021  1. No definite acute abnormality. 2.  Heterogeneous liver attenuation, nonspecific, but can be seen with hepatitis. Additionally, several wedge-shaped peripheral regions of enhancement in the liver are noted, which favor areas of shunting versus hemangiomas. Nonemergent liver mass protocol CT or MRI can be obtained as an outpatient for definitive characterization    CT ABD PELV W WO CONT    Result Date: 12/15/2021  1. Findings within the liver are compatible with transient perfusion abnormality/shunting. A discrete mass lesion is not identified    XR CHEST PORT    Result Date: 12/14/2021  No acute findings. MRI FINGER/HAND JT RT W WO CONT    Result Date: 12/16/2021  1.  On the prior radiographs, there was a mildly displaced fracture through the distal phalangeal tuft. The small displaced osseous fragment is not well-seen on MRI. Abnormal signal in the fifth distal phalanx could be related to trauma and/or infection. 2. Mild edema in the fifth middle phalanx may be reactive or related to early osteomyelitis. 3. Diffuse subcutaneous edema in the fifth finger. No evidence of a focal drainable fluid collection. XR ABD (KUB)    Result Date: 12/20/2021  impression: Moderate fecal stasis.          Current Medications:     Current Facility-Administered Medications:     pantoprazole (PROTONIX) tablet 40 mg, 40 mg, Oral, ACB&D, Chery Graf MD, 40 mg at 12/20/21 1534    metoclopramide HCl (REGLAN) injection 5 mg, 5 mg, IntraVENous, Q6H, Christina Graf MD, 5 mg at 12/21/21 0000    polyethylene glycol (MIRALAX) packet 17 g, 17 g, Oral, DAILY, Chery Graf MD, 17 g at 12/20/21 1151    mupirocin (BACTROBAN) 2 % ointment, , Topical, BID, Roberto Carlos Mccoy MD, Given at 12/20/21 2013    L.acidophilus-paracasei-S.thermophil-bifidobacter (RISAQUAD) 8 billion cell capsule, 1 Capsule, Oral, DAILY, Roberto Carlos Mccoy MD    vancomycin (VANCOCIN) 1,000 mg in 0.9% sodium chloride 250 mL (VIAL-MATE), 1,000 mg, IntraVENous, Q8H, Chery Graf MD, Last Rate: 250 mL/hr at 12/20/21 2304, 1,000 mg at 12/20/21 2304    heparin (porcine) injection 5,000 Units, 5,000 Units, SubCUTAneous, Q12H, Christina Grfa MD, 5,000 Units at 12/20/21 2012    acetaminophen (TYLENOL) tablet 650 mg, 650 mg, Oral, Q6H, Chery Graf MD, 650 mg at 12/20/21 1745    naloxone [facility]) injection 0.4 mg, 0.4 mg, IntraVENous, PRN, Christian Hermosillo C, DO    ketorolac (TORADOL) injection 30 mg, 30 mg, IntraVENous, Q6H PRN, Christina Graf MD, 30 mg at 12/20/21 2304    lidocaine 4 % patch 1 Patch, 1 Patch, TransDERmal, Q24H, Christian Zelaya, DO, 1 Patch at 12/20/21 1331    gabapentin (NEURONTIN) capsule 100 mg, 100 mg, Oral, BID, Christian Zelaya, DO, 100 mg at 12/20/21 1746    oxyCODONE IR (ROXICODONE) tablet 5 mg, 5 mg, Oral, Q6H PRN, Dereck Ruiz C, DO, 5 mg at 12/20/21 2004    morphine injection 1 mg, 1 mg, IntraVENous, Q8H PRN, Brenda Scale, Christian C, DO, 1 mg at 12/21/21 8784    promethazine (PHENERGAN) tablet 25 mg, 25 mg, Oral, Q6H PRN, Brenda Scale, Christian C, DO, 25 mg at 12/20/21 1053    [Held by provider] famotidine (PEPCID) tablet 20 mg, 20 mg, Oral, BID, Zelaya, Christian C, DO, 20 mg at 12/20/21 5227    senna-docusate (PERICOLACE) 8.6-50 mg per tablet 2 Tablet, 2 Tablet, Oral, DAILY, Arville Net, DO, 2 Tablet at 12/20/21 3134    sodium chloride (NS) flush 5-40 mL, 5-40 mL, IntraVENous, Q8H, Zelaya, Christian C, DO, 10 mL at 12/20/21 2147    sodium chloride (NS) flush 5-40 mL, 5-40 mL, IntraVENous, PRN, Brenda Scale, Christian C, DO, 10 mL at 12/19/21 1629    acetaminophen (TYLENOL) tablet 650 mg, 650 mg, Oral, Q6H PRN, 650 mg at 12/17/21 1852 **OR** acetaminophen (TYLENOL) suppository 650 mg, 650 mg, Rectal, Q6H PRN, Brenda Scale, Christian C, DO    polyethylene glycol (MIRALAX) packet 17 g, 17 g, Oral, DAILY PRN, Brenda Scale, Christian C, DO    ondansetron (ZOFRAN ODT) tablet 4 mg, 4 mg, Oral, Q8H PRN, 4 mg at 12/18/21 1700 **OR** ondansetron (ZOFRAN) injection 4 mg, 4 mg, IntraVENous, Q6H PRN, Brenda Scale, Christian C, DO, 4 mg at 12/20/21 2143    [Held by provider] cefepime (MAXIPIME) 2 g in 0.9% sodium chloride (MBP/ADV) 100 mL MBP, 2 g, IntraVENous, Q8H, Zelaya, Christian C, DO, Last Rate: 200 mL/hr at 12/20/21 0520, 2 g at 77/15/90 0994    folic acid (FOLVITE) tablet 1 mg, 1 mg, Oral, DAILY, Zelaya, Christian C, DO, 1 mg at 12/20/21 0067    thiamine mononitrate (B-1) tablet 100 mg, 100 mg, Oral, DAILY, Zelaya, Christian C, DO, 100 mg at 12/20/21 8909     Procedures: see electronic medical records for all procedures/Xrays and details which were not copied into this note but were reviewed prior to creation of Plan.     Reviewed most current lab test results and cultures  YES  Reviewed most current radiology test results   YES  Review and summation of old records today    NO  Reviewed patient's current orders and MAR    YES  PMH/SH reviewed - no change compared to H&P  ________________________________________________________________________  Care Plan discussed with:    Comments   Patient x    Family      RN     Care Manager     Consultant                        Multidiciplinary team rounds were held today with , nursing, pharmacist and clinical coordinator. Patient's plan of care was discussed; medications were reviewed and discharge planning was addressed.      ________________________________________________________________________  Total NON critical care TIME:   25  Minutes    Total CRITICAL CARE TIME Spent:   Minutes non procedure based      Comments   >50% of visit spent in counseling and coordination of care x     This includes time during multidisciplinary rounds if indicated above   ________________________________________________________________________  Denver Flavors, MD

## 2021-12-22 VITALS
HEART RATE: 60 BPM | BODY MASS INDEX: 17.18 KG/M2 | RESPIRATION RATE: 19 BRPM | TEMPERATURE: 98.6 F | SYSTOLIC BLOOD PRESSURE: 139 MMHG | HEIGHT: 70 IN | WEIGHT: 120 LBS | OXYGEN SATURATION: 100 % | DIASTOLIC BLOOD PRESSURE: 77 MMHG

## 2021-12-22 LAB
ANION GAP SERPL CALC-SCNC: 5 MMOL/L (ref 5–15)
BUN SERPL-MCNC: 16 MG/DL (ref 6–20)
BUN/CREAT SERPL: 20 (ref 12–20)
CALCIUM SERPL-MCNC: 9.5 MG/DL (ref 8.5–10.1)
CHLORIDE SERPL-SCNC: 98 MMOL/L (ref 97–108)
CO2 SERPL-SCNC: 29 MMOL/L (ref 21–32)
CREAT SERPL-MCNC: 0.79 MG/DL (ref 0.7–1.3)
ERYTHROCYTE [DISTWIDTH] IN BLOOD BY AUTOMATED COUNT: 14.1 % (ref 11.5–14.5)
GLUCOSE SERPL-MCNC: 110 MG/DL (ref 65–100)
HCT VFR BLD AUTO: 40.2 % (ref 36.6–50.3)
HGB BLD-MCNC: 13.4 G/DL (ref 12.1–17)
MAGNESIUM SERPL-MCNC: 2.1 MG/DL (ref 1.6–2.4)
MCH RBC QN AUTO: 28.5 PG (ref 26–34)
MCHC RBC AUTO-ENTMCNC: 33.3 G/DL (ref 30–36.5)
MCV RBC AUTO: 85.4 FL (ref 80–99)
NRBC # BLD: 0 K/UL (ref 0–0.01)
NRBC BLD-RTO: 0 PER 100 WBC
PLATELET # BLD AUTO: 434 K/UL (ref 150–400)
PMV BLD AUTO: 8.5 FL (ref 8.9–12.9)
POTASSIUM SERPL-SCNC: 4.2 MMOL/L (ref 3.5–5.1)
RBC # BLD AUTO: 4.71 M/UL (ref 4.1–5.7)
SODIUM SERPL-SCNC: 132 MMOL/L (ref 136–145)
WBC # BLD AUTO: 13.2 K/UL (ref 4.1–11.1)

## 2021-12-22 PROCEDURE — 74011000258 HC RX REV CODE- 258: Performed by: ORTHOPAEDIC SURGERY

## 2021-12-22 PROCEDURE — 74011250637 HC RX REV CODE- 250/637: Performed by: ORTHOPAEDIC SURGERY

## 2021-12-22 PROCEDURE — 74011250636 HC RX REV CODE- 250/636: Performed by: GENERAL ACUTE CARE HOSPITAL

## 2021-12-22 PROCEDURE — 83735 ASSAY OF MAGNESIUM: CPT

## 2021-12-22 PROCEDURE — 80048 BASIC METABOLIC PNL TOTAL CA: CPT

## 2021-12-22 PROCEDURE — 74011250636 HC RX REV CODE- 250/636: Performed by: ORTHOPAEDIC SURGERY

## 2021-12-22 PROCEDURE — 74011250637 HC RX REV CODE- 250/637: Performed by: GENERAL ACUTE CARE HOSPITAL

## 2021-12-22 PROCEDURE — 85027 COMPLETE CBC AUTOMATED: CPT

## 2021-12-22 PROCEDURE — 74011250637 HC RX REV CODE- 250/637: Performed by: INTERNAL MEDICINE

## 2021-12-22 PROCEDURE — 36415 COLL VENOUS BLD VENIPUNCTURE: CPT

## 2021-12-22 RX ORDER — OXYCODONE HYDROCHLORIDE 5 MG/1
5 TABLET ORAL
Qty: 7 TABLET | Refills: 0 | Status: SHIPPED | OUTPATIENT
Start: 2021-12-22 | End: 2021-12-25

## 2021-12-22 RX ORDER — PANTOPRAZOLE SODIUM 40 MG/1
40 TABLET, DELAYED RELEASE ORAL DAILY
Qty: 30 TABLET | Refills: 0 | Status: SHIPPED | OUTPATIENT
Start: 2021-12-22 | End: 2022-01-21

## 2021-12-22 RX ORDER — DOXYCYCLINE 100 MG/1
100 CAPSULE ORAL 2 TIMES DAILY
Qty: 28 CAPSULE | Refills: 0 | Status: SHIPPED | OUTPATIENT
Start: 2021-12-22 | End: 2022-01-05

## 2021-12-22 RX ADMIN — PANTOPRAZOLE SODIUM 40 MG: 40 TABLET, DELAYED RELEASE ORAL at 09:01

## 2021-12-22 RX ADMIN — OXYCODONE 5 MG: 5 TABLET ORAL at 09:12

## 2021-12-22 RX ADMIN — Medication 100 MG: at 09:01

## 2021-12-22 RX ADMIN — Medication 1 CAPSULE: at 09:08

## 2021-12-22 RX ADMIN — ALPRAZOLAM 0.5 MG: 0.5 TABLET ORAL at 04:25

## 2021-12-22 RX ADMIN — HEPARIN SODIUM 5000 UNITS: 5000 INJECTION INTRAVENOUS; SUBCUTANEOUS at 09:02

## 2021-12-22 RX ADMIN — VANCOMYCIN HYDROCHLORIDE 1000 MG: 1 INJECTION, POWDER, LYOPHILIZED, FOR SOLUTION INTRAVENOUS at 09:05

## 2021-12-22 RX ADMIN — FOLIC ACID 1 MG: 1 TABLET ORAL at 09:01

## 2021-12-22 RX ADMIN — GABAPENTIN 100 MG: 100 CAPSULE ORAL at 09:07

## 2021-12-22 RX ADMIN — DOCUSATE SODIUM 50MG AND SENNOSIDES 8.6MG 2 TABLET: 8.6; 5 TABLET, FILM COATED ORAL at 09:01

## 2021-12-22 RX ADMIN — MUPIROCIN: 20 OINTMENT TOPICAL at 09:17

## 2021-12-22 NOTE — PROGRESS NOTES
ID  Case discussed with Dr Behzad Aguilar, hospitalist  Continue empiric Vancomycin and Cefepime IV until today ( 7 days ) ,  Patient may be discharged on p.o. antibiotics-Doxycycline  100 mg p.o. twice daily for 2 weeks. Continue probiotic during course of antibiotic therapy. Avoid direct sun exposure during Doxycycline. ID follow-up not required. IV will be on vacation from 12/22  Please contact Dr. Amanda Cedeño with questions, concerns.

## 2021-12-22 NOTE — DISCHARGE SUMMARY
Hospitalist Discharge Summary     Patient ID:  Rachel Alberto  [id number]  36 y.o.  1990 12/14/2021    PCP on record: None    Admit date: 12/14/2021  Discharge date and time: 12/22/2021    DISCHARGE DIAGNOSIS:      Right fifth finger open fracture of the distal phalanx with osteomyelitis and necrotic tissue at the distal finger. - s/p PARTIAL AMPUTATION  Heroin abuse  Drug abuse  Drug seeking behavior   Hepatitis C        CONSULTATIONS:  IP CONSULT TO ORTHOPEDIC SURGERY  IP CONSULT TO INFECTIOUS DISEASES    Excerpted HPI from H&P of Jasvir Perrin MD:  Rachel Alebrto is a 36 y.o.male who presents with no significant past medical history is coming the hospital chief complaints of right fifth finger pain. Patient was incarcerated yesterday and is apparently halfway. He reports that he had a blood blister about 4 days ago and it burst open and subsequently started to have some drainage along with pain and also redness. He does not report any fever or chills. Does not seem reports similar episodes in the past.  Reports he may be withdrawing from fentanyl and heroin in. He also reports nausea along with some right-sided upper abdominal pain.     On arrival to ED, vital signs are normal.  On labs white blood cell count shows 14,000. On labs BMP is normal.  LFTs are normal.  Lipase is normal.  Troponin is normal.  CT abdomen shows possibility of liver hemangiomas and recommended CT per liver mass protocol.    ______________________________________________________________________  DISCHARGE SUMMARY/HOSPITAL COURSE:  for full details see H&P, daily progress notes, labs, consult notes. Right fifth finger open fracture of the distal phalanx with osteomyelitis and necrotic tissue at the distal finger. MRSA wound infection   -X-ray confirms osteomyelitis.     MRI: 1. On the prior radiographs, there was a mildly displaced fracture through the  distal phalangeal tuft.  The small displaced osseous fragment is not well-seen on  MRI. Abnormal signal in the fifth distal phalanx could be related to trauma  and/or infection. 2. Mild edema in the fifth middle phalanx may be reactive or related to early osteomyelitis. 3. Diffuse subcutaneous edema in the fifth finger. No evidence of a focal drainable fluid collection. Pain control    S/p Right distal fingertip amputation to the level of the distal portion of the middle phalanx with skin closure, Excisional debridement of skin, subcutaneous tissues, muscle, and bone along with tendon. Intra operative Cx grew MRSA  Received empiric vancomycin and cefepime until yesterday. Discussed with ID, changed to doxycycline for 2 more weeks       Abdominal pain, nausea and vomiting  -CT abdomen shows liver hemangiomas but no other acute process  -CMP is normal.  Lipase level is normal.  Troponin is normal.  -Resolved     Heroin abuse  Drug abuse  Drug seeking behavior   -Patient is reporting that he is withdrawing from fentanyl and heroin  -denies alcohol abuse  -Avoid increasing pain meds. Due to his recent surgery, oxycodone was prescribed for few days     Hepatitis C  CT and reported Heterogeneous liver attenuation, nonspecific, but can be seen with  hepatitis. Additionally, several wedge-shaped peripheral regions of enhancement  in the liver are noted, which favor areas of shunting versus hemangiomas. CT liver protocol did not show any liver lesions  AFP neg  HIV neg  Hepatology f/u as outpt      N/V  Fecal stasis  Start miralax and senna  Trial of Reglan        _______________________________________________________________________  Patient seen and examined by me on discharge day. Pertinent Findings:  Gen:    Not in distress  Chest: Clear lungs  CVS:   Regular rhythm.   No edema  Abd:  Soft, not distended, not tender  Neuro:  Alert,   _______________________________________________________________________  DISCHARGE MEDICATIONS:   Discharge Medication List as of 12/22/2021 11:10 AM      START taking these medications    Details   doxycycline (MONODOX) 100 mg capsule Take 1 Capsule by mouth two (2) times a day for 14 days. , Print, Disp-28 Capsule, R-0      oxyCODONE IR (ROXICODONE) 5 mg immediate release tablet Take 1 Tablet by mouth every six (6) hours as needed for Pain for up to 3 days. Max Daily Amount: 20 mg., Print, Disp-7 Tablet, R-0      pantoprazole (PROTONIX) 40 mg tablet Take 1 Tablet by mouth daily for 30 days. , Print, Disp-30 Tablet, R-0               Patient Follow Up Instructions: Activity: Activity as tolerated  Diet: Regular Diet    Follow-up Information     Follow up With Specialties Details Why [address] [place] [address]   Receive follow up care. [address] [place] [id number]  [phone]     Anjali Second, DO Orthopedic Surgery Schedule an appointment as soon as possible for a visit in 2 weeks Orthopedic Surgeon. Follow up in 10-14 days.   [address] [facility],  [phone]  [phone]      Primary Care Md follow up  Schedule an appointment as soon as possible for a visit in 1 week          ________________________________________________________________    Risk of deterioration: Low    Condition at Discharge:  Stable  __________________________________________________________________    Disposition  Home with family, no needs    ____________________________________________________________________    Code Status: Full Code  ___________________________________________________________________      Total time in minutes spent coordinating this discharge (includes going over instructions, follow-up, prescriptions, and preparing report for sign off to her PCP) :  >30 minutes    Signed:  Elisabeth Munoz MD

## 2021-12-22 NOTE — PROGRESS NOTES
In a CM perspective, pt is ready for discharge. RN made aware. Transition of Care Plan:     RUR: 9% - low risk  Disposition: Critical access hospital  Follow up appointments: at [facility]  DME needed: No needs identified  Transportation at [address] or means to access home:  [place] [facility] Medicare Letter: N/A  Is patient a BCPI-A Bundle: No                  If yes, was Bundle Letter given?: N/A   Is patient a  and connected with the [address] [place] [address] Road  If yes, was [employer] transfer form completed and VA notified? [facility]  Discharge Caregiver contacted prior to discharge?   At bedside     Initial Note 10:53 am: CM aware of discharge. Pt will receive his follow up and medical care at [facility]. In a CM perspective, pt is ready for discharge. RN made aware. Care Management Interventions  PCP Verified by CM: Yes  Mode of Transport at Discharge:  Other (see comment) (Deputies from [facility], [facility])  Transition of Care Consult (CM Consult): Discharge Planning  Discharge Durable Medical Equipment: No  Physical Therapy Consult: No  Occupational Therapy Consult: No  Speech Therapy Consult: No  Support Systems: [facility]  Confirm Follow Up Transport: Other (see comment) (Deputies)  The Plan for Transition of Care is Related to the Following Treatment Goals : Return to [facility]  The Patient and/or Patient Representative was Provided with a Choice of Provider and Agrees with the Discharge Plan?: Yes  Name of the Patient Representative Who was Provided with a Choice of Provider and Agrees with the Discharge Plan: Deputies and pt  Freedom of Choice List was Provided with Basic Dialogue that Supports the Patient's Individualized Plan of Care/Goals, Treatment Preferences and Shares the Quality Data Associated with the Providers?: Yes  Discharge Location  Discharge Placement: Law Enforcement Custody    Kanchan Umana RN, BSN, [address] Manager  [phone]

## 2021-12-22 NOTE — PROGRESS NOTES
Pt slept off and on throughout the shift. Pt refused heparin stating that he wasn't going to take it. Pt anxious. Pt sleeping at midnight and at 0600, held tylenol and reglan. No c/o N/V entire shift. Pt refused IV ABX stating to leave him alone he didn't want that. Guards in room. Pt continues with right ankle shackle.

## 2022-03-19 PROBLEM — E43 SEVERE PROTEIN-CALORIE MALNUTRITION (HCC): Status: ACTIVE | Noted: 2021-12-16

## 2022-03-20 PROBLEM — M86.9 OSTEOMYELITIS OF FINGER OF RIGHT HAND (HCC): Status: ACTIVE | Noted: 2021-12-14

## (undated) DEVICE — DRESSING,GAUZE,XEROFORM,CURAD,1"X8",ST: Brand: CURAD

## (undated) DEVICE — SUTURE VCRL SZ 3-0 L54IN ABSRB VLT LIGAPAK REEL NDL J205G

## (undated) DEVICE — SUTURE MCRYL SZ 3-0 L27IN ABSRB UD L26MM SH 1/2 CIR Y416H

## (undated) DEVICE — GOWN,SIRUS,FABRNF,XL,20/CS: Brand: MEDLINE

## (undated) DEVICE — BANDAGE,GAUZE,BULKEE II,2.25"X3YD,STRL: Brand: MEDLINE

## (undated) DEVICE — SUTURE MCRYL SZ 4-0 L27IN ABSRB UD SH L26MM 1/2 CIR Y415H

## (undated) DEVICE — SPONGE GZ W4XL4IN COT 12 PLY TYP VII WVN C FLD DSGN

## (undated) DEVICE — SUTURE VCRL SZ 2-0 L27IN ABSRB UD L26MM SH 1/2 CIR J417H

## (undated) DEVICE — SUTURE ETHLN SZ 4-0 L18IN NONABSORBABLE BLK L16MM PC-3 3/8 1864G

## (undated) DEVICE — HAND-MRMCASU: Brand: MEDLINE INDUSTRIES, INC.

## (undated) DEVICE — SUTURE ETHBND EXCEL SZ 3-0 L30IN NONABSORBABLE GRN V-5 X916H

## (undated) DEVICE — SOLUTION IRRIG 1000ML 0.9% SOD CHL USP POUR PLAS BTL

## (undated) DEVICE — REM POLYHESIVE ADULT PATIENT RETURN ELECTRODE: Brand: VALLEYLAB

## (undated) DEVICE — BANDAGE,GAUZE,BULKEE II,4.5"X4.1YD,STRL: Brand: MEDLINE

## (undated) DEVICE — SUTURE COAT VCRL SZ 4-0 L18IN ABSRB UD L16MM PC-3 3/8 CIR J845G

## (undated) DEVICE — DISPOSABLE TOURNIQUET CUFF SINGLE BLADDER, DUAL PORT AND QUICK CONNECT CONNECTOR: Brand: COLOR CUFF